# Patient Record
Sex: MALE | Race: WHITE | Employment: FULL TIME | URBAN - METROPOLITAN AREA
[De-identification: names, ages, dates, MRNs, and addresses within clinical notes are randomized per-mention and may not be internally consistent; named-entity substitution may affect disease eponyms.]

---

## 2018-03-19 ENCOUNTER — ANESTHESIA (OUTPATIENT)
Dept: OPERATING ROOM | Age: 53
End: 2018-03-19
Payer: COMMERCIAL

## 2018-03-19 ENCOUNTER — ANESTHESIA EVENT (OUTPATIENT)
Dept: OPERATING ROOM | Age: 53
End: 2018-03-19
Payer: COMMERCIAL

## 2018-03-19 ENCOUNTER — HOSPITAL ENCOUNTER (OUTPATIENT)
Age: 53
Setting detail: OUTPATIENT SURGERY
Discharge: HOME | End: 2018-03-19
Attending: SURGERY | Admitting: SURGERY
Payer: COMMERCIAL

## 2018-03-19 VITALS
OXYGEN SATURATION: 96 % | HEART RATE: 57 BPM | TEMPERATURE: 97.5 F | HEIGHT: 71 IN | BODY MASS INDEX: 31.22 KG/M2 | SYSTOLIC BLOOD PRESSURE: 112 MMHG | RESPIRATION RATE: 18 BRPM | WEIGHT: 223 LBS | DIASTOLIC BLOOD PRESSURE: 76 MMHG

## 2018-03-19 VITALS — DIASTOLIC BLOOD PRESSURE: 55 MMHG | OXYGEN SATURATION: 96 % | SYSTOLIC BLOOD PRESSURE: 92 MMHG

## 2018-03-19 PROCEDURE — 2580000003 HC RX 258: Performed by: SURGERY

## 2018-03-19 PROCEDURE — 2500000003 HC RX 250 WO HCPCS

## 2018-03-19 PROCEDURE — 3700000001 HC ADD 15 MINUTES (ANESTHESIA): Performed by: SURGERY

## 2018-03-19 PROCEDURE — 7100000001 HC PACU RECOVERY - ADDTL 15 MIN: Performed by: SURGERY

## 2018-03-19 PROCEDURE — 7100000000 HC PACU RECOVERY - FIRST 15 MIN: Performed by: SURGERY

## 2018-03-19 PROCEDURE — 3609027000 HC COLONOSCOPY: Performed by: SURGERY

## 2018-03-19 PROCEDURE — 6360000002 HC RX W HCPCS

## 2018-03-19 PROCEDURE — 3700000000 HC ANESTHESIA ATTENDED CARE: Performed by: SURGERY

## 2018-03-19 PROCEDURE — 7100000031 HC ASPR PHASE II RECOVERY - ADDTL 15 MIN: Performed by: SURGERY

## 2018-03-19 PROCEDURE — 7100000030 HC ASPR PHASE II RECOVERY - FIRST 15 MIN: Performed by: SURGERY

## 2018-03-19 RX ORDER — LIDOCAINE HYDROCHLORIDE 10 MG/ML
INJECTION, SOLUTION EPIDURAL; INFILTRATION; INTRACAUDAL; PERINEURAL PRN
Status: DISCONTINUED | OUTPATIENT
Start: 2018-03-19 | End: 2018-03-19 | Stop reason: SDUPTHER

## 2018-03-19 RX ORDER — MEPERIDINE HYDROCHLORIDE 50 MG/ML
12.5 INJECTION INTRAMUSCULAR; INTRAVENOUS; SUBCUTANEOUS EVERY 5 MIN PRN
Status: DISCONTINUED | OUTPATIENT
Start: 2018-03-19 | End: 2018-03-19 | Stop reason: HOSPADM

## 2018-03-19 RX ORDER — SODIUM CHLORIDE, SODIUM LACTATE, POTASSIUM CHLORIDE, CALCIUM CHLORIDE 600; 310; 30; 20 MG/100ML; MG/100ML; MG/100ML; MG/100ML
INJECTION, SOLUTION INTRAVENOUS CONTINUOUS
Status: DISCONTINUED | OUTPATIENT
Start: 2018-03-19 | End: 2018-03-19 | Stop reason: HOSPADM

## 2018-03-19 RX ORDER — FENTANYL CITRATE 50 UG/ML
INJECTION, SOLUTION INTRAMUSCULAR; INTRAVENOUS PRN
Status: DISCONTINUED | OUTPATIENT
Start: 2018-03-19 | End: 2018-03-19 | Stop reason: SDUPTHER

## 2018-03-19 RX ORDER — PROPOFOL 10 MG/ML
INJECTION, EMULSION INTRAVENOUS PRN
Status: DISCONTINUED | OUTPATIENT
Start: 2018-03-19 | End: 2018-03-19 | Stop reason: SDUPTHER

## 2018-03-19 RX ORDER — MORPHINE SULFATE 2 MG/ML
2 INJECTION, SOLUTION INTRAMUSCULAR; INTRAVENOUS EVERY 5 MIN PRN
Status: DISCONTINUED | OUTPATIENT
Start: 2018-03-19 | End: 2018-03-19 | Stop reason: HOSPADM

## 2018-03-19 RX ORDER — DIPHENHYDRAMINE HYDROCHLORIDE 50 MG/ML
12.5 INJECTION INTRAMUSCULAR; INTRAVENOUS
Status: DISCONTINUED | OUTPATIENT
Start: 2018-03-19 | End: 2018-03-19 | Stop reason: HOSPADM

## 2018-03-19 RX ORDER — LABETALOL HYDROCHLORIDE 5 MG/ML
5 INJECTION, SOLUTION INTRAVENOUS EVERY 10 MIN PRN
Status: DISCONTINUED | OUTPATIENT
Start: 2018-03-19 | End: 2018-03-19 | Stop reason: HOSPADM

## 2018-03-19 RX ORDER — DEXAMETHASONE SODIUM PHOSPHATE 4 MG/ML
INJECTION, SOLUTION INTRA-ARTICULAR; INTRALESIONAL; INTRAMUSCULAR; INTRAVENOUS; SOFT TISSUE PRN
Status: DISCONTINUED | OUTPATIENT
Start: 2018-03-19 | End: 2018-03-19 | Stop reason: SDUPTHER

## 2018-03-19 RX ORDER — ONDANSETRON 2 MG/ML
INJECTION INTRAMUSCULAR; INTRAVENOUS PRN
Status: DISCONTINUED | OUTPATIENT
Start: 2018-03-19 | End: 2018-03-19 | Stop reason: SDUPTHER

## 2018-03-19 RX ORDER — MIDAZOLAM HYDROCHLORIDE 1 MG/ML
INJECTION INTRAMUSCULAR; INTRAVENOUS PRN
Status: DISCONTINUED | OUTPATIENT
Start: 2018-03-19 | End: 2018-03-19 | Stop reason: SDUPTHER

## 2018-03-19 RX ORDER — ONDANSETRON 2 MG/ML
4 INJECTION INTRAMUSCULAR; INTRAVENOUS
Status: DISCONTINUED | OUTPATIENT
Start: 2018-03-19 | End: 2018-03-19 | Stop reason: HOSPADM

## 2018-03-19 RX ADMIN — LIDOCAINE HYDROCHLORIDE 50 MG: 10 INJECTION, SOLUTION EPIDURAL; INFILTRATION; INTRACAUDAL; PERINEURAL at 07:24

## 2018-03-19 RX ADMIN — ONDANSETRON 4 MG: 2 INJECTION INTRAMUSCULAR; INTRAVENOUS at 07:30

## 2018-03-19 RX ADMIN — MIDAZOLAM 2 MG: 1 INJECTION INTRAMUSCULAR; INTRAVENOUS at 07:22

## 2018-03-19 RX ADMIN — FENTANYL CITRATE 50 MCG: 50 INJECTION INTRAMUSCULAR; INTRAVENOUS at 07:38

## 2018-03-19 RX ADMIN — SODIUM CHLORIDE, POTASSIUM CHLORIDE, SODIUM LACTATE AND CALCIUM CHLORIDE: 600; 310; 30; 20 INJECTION, SOLUTION INTRAVENOUS at 08:26

## 2018-03-19 RX ADMIN — FENTANYL CITRATE 50 MCG: 50 INJECTION INTRAMUSCULAR; INTRAVENOUS at 07:30

## 2018-03-19 RX ADMIN — DEXAMETHASONE SODIUM PHOSPHATE 4 MG: 4 INJECTION, SOLUTION INTRAMUSCULAR; INTRAVENOUS at 07:30

## 2018-03-19 RX ADMIN — SODIUM CHLORIDE, POTASSIUM CHLORIDE, SODIUM LACTATE AND CALCIUM CHLORIDE: 600; 310; 30; 20 INJECTION, SOLUTION INTRAVENOUS at 06:59

## 2018-03-19 RX ADMIN — PROPOFOL 200 MG: 10 INJECTION, EMULSION INTRAVENOUS at 07:24

## 2018-03-19 ASSESSMENT — PULMONARY FUNCTION TESTS
PIF_VALUE: 15
PIF_VALUE: 14
PIF_VALUE: 15
PIF_VALUE: 6
PIF_VALUE: 25
PIF_VALUE: 15
PIF_VALUE: 11
PIF_VALUE: 15
PIF_VALUE: 14
PIF_VALUE: 15
PIF_VALUE: 15
PIF_VALUE: 18
PIF_VALUE: 0
PIF_VALUE: 1
PIF_VALUE: 14
PIF_VALUE: 15
PIF_VALUE: 14
PIF_VALUE: 1
PIF_VALUE: 15
PIF_VALUE: 6
PIF_VALUE: 15
PIF_VALUE: 14
PIF_VALUE: 15
PIF_VALUE: 15
PIF_VALUE: 14

## 2018-03-19 ASSESSMENT — PAIN SCALES - GENERAL
PAINLEVEL_OUTOF10: 0
PAINLEVEL_OUTOF10: 0

## 2018-03-19 ASSESSMENT — PAIN - FUNCTIONAL ASSESSMENT: PAIN_FUNCTIONAL_ASSESSMENT: 0-10

## 2018-03-19 ASSESSMENT — ENCOUNTER SYMPTOMS
STRIDOR: 0
SHORTNESS OF BREATH: 0

## 2018-03-19 ASSESSMENT — LIFESTYLE VARIABLES: SMOKING_STATUS: 0

## 2018-03-19 NOTE — ANESTHESIA POSTPROCEDURE EVALUATION
POST- ANESTHESIA EVALUATION       Pt Name: Juan Alberto Uribe  MRN: [id number]  YOB: 1965  Date of evaluation: 3/19/2018  Time:  10:43 AM      /76   Pulse 57   Temp 97.5 °F (36.4 °C) (Temporal)   Resp 18   Ht 5' 10.5\" (1.791 m)   Wt 223 lb (101.2 kg)   SpO2 96%   BMI 31.54 kg/m²      Consciousness Level  Awake  Cardiopulmonary Status  Stable  Pain Adequately Treated YES  Nausea / Vomiting  NO  Adequate Hydration  YES  Anesthesia Related Complications NONE      Electronically signed by Sindy Flores MD on 3/19/2018 at 10:43 AM       Department of Anesthesiology  Postprocedure Note    Patient: Juan Alberto Uribe  MRN: [id number]  YOB: 1965  Date of evaluation: 3/19/2018  Time:  10:42 AM     Procedure Summary     Date:  03/19/18 Room / Location:  [place] [address]    Anesthesia Start:  6269 Anesthesia Stop:  0809    Procedure:  COLONOSCOPY (N/A ) Diagnosis:  (SCREENING  (PAT ON ADMIT OFFICE TO [facility]))    Surgeon:  Malinda Richardson MD Responsible Provider:  Sindy Flores MD    Anesthesia Type:  general ASA Status:  2          Anesthesia Type: general    Juliocesar Phase I: Juliocesar Score: 10    Juliocesar Phase II: Juliocesar Score: 10    Last vitals: Reviewed and per EMR flowsheets.        Anesthesia Post Evaluation

## 2018-03-19 NOTE — OP NOTE
PROCEDURE NOTE    DATE OF PROCEDURE: 3/19/2018    SURGEON: Sugar Mcdonald MD    ASSISTANT: None    PREOPERATIVE DIAGNOSIS: screening colonoscopy    POSTOPERATIVE DIAGNOSIS: sigmoid diverticulosis/ grade I internal hemorrhoids/ prominent external hemorrhoids    OPERATION: Total colonoscopy to cecum    ANESTHESIA: Gen    ESTIMATED BLOOD LOSS: None    COMPLICATIONS: None     SPECIMENS:  Was Not Obtained    HISTORY: The patient is a 50y.o. year old male with history of above preop diagnosis. I recommended colonoscopy with possible biopsy or polypectomy and I explained the risk, benefits, expected outcome, and alternatives to the procedure. Risks included but are not limited to bleeding, infection, respiratory distress, hypotension, and perforation of the colon and possibility of missing a lesion. The patient understands and is in agreement. PROCEDURE: The patient was given IV conscious sedation. The patient's SPO2 remained above 90% throughout the procedure. Digital rectal exam was normal.  The colonoscope was inserted through the anus into the rectum and advanced under direct vision to the cecum without difficulty. Terminal ileum was examined for approximately 2 inches. The prep was good. Findings:    Cecum/Ascending colon: normal    Transverse colon: normal    Descending/Sigmoid colon: abnormal: diverticulosis    Rectum/Anus: examined in normal and retroflexed positions and was abnormal: grade I internal and prominent external hemorrhoids    Withdrawal Time was (minutes): 12      Next screening colonoscopy: 10 years. The colon was decompressed. While withdrawing the scope the above findings were verified and the scope was removed. The patient tolerated the procedure and conscious sedation without unusual events. In the recovery room patient was examined and remains hemodynamically stable. Discharge home when criteria met. Recommendations/Plan:     1. Discussed with the family  2.  High

## 2018-03-19 NOTE — H&P
expansion. Clear to auscultation with no adventitious sounds. ABDOMEN:  Mildly obese. Normoactive bowel sounds. Soft on palpation. No localized tenderness. No guarding or rigidity. No palpable organomegaly. LYMPHATICS:  No palpable cervical lymphadenopathy. LOCOMOTOR, BACK AND SPINE:  No tenderness or deformities. No flank tenderness. EXTREMITIES:  Symmetrical with no pretibial/pedal edema. No discoloration or ulcerations. No warmth, tenderness, erythema noted in lower legs bilaterally. NEUROLOGIC:  The patient is conscious, alert, oriented. No apparent focal sensory or motor deficits. Cranial nerve exam reveals no deficits. PROVISIONAL DIAGNOSES / SURGERY:      Colorectal Cancer Screening  Colonoscopy    There are no active problems to display for this patient.           Doug Wade PA-C on 3/19/2018 at 7:01 AM

## 2018-08-31 ENCOUNTER — HOSPITAL ENCOUNTER (OUTPATIENT)
Dept: PREADMISSION TESTING | Age: 53
Discharge: HOME | End: 2018-09-04
Payer: COMMERCIAL

## 2018-08-31 VITALS
BODY MASS INDEX: 33.93 KG/M2 | HEIGHT: 70 IN | SYSTOLIC BLOOD PRESSURE: 131 MMHG | TEMPERATURE: 97.3 F | HEART RATE: 68 BPM | DIASTOLIC BLOOD PRESSURE: 96 MMHG | RESPIRATION RATE: 16 BRPM | OXYGEN SATURATION: 99 % | WEIGHT: 237 LBS

## 2018-08-31 LAB
ABSOLUTE EOS #: 0.1 K/UL (ref 0–0.4)
ABSOLUTE IMMATURE GRANULOCYTE: ABNORMAL K/UL (ref 0–0.3)
ABSOLUTE LYMPH #: 1.3 K/UL (ref 1–4.8)
ABSOLUTE MONO #: 0.6 K/UL (ref 0.1–1.3)
ALBUMIN SERPL-MCNC: 4.6 G/DL (ref 3.5–5.2)
ALBUMIN/GLOBULIN RATIO: ABNORMAL (ref 1–2.5)
ALP BLD-CCNC: 43 U/L (ref 40–129)
ALT SERPL-CCNC: 78 U/L (ref 5–41)
AMYLASE: 67 U/L (ref 28–100)
ANION GAP SERPL CALCULATED.3IONS-SCNC: 10 MMOL/L (ref 9–17)
AST SERPL-CCNC: 44 U/L
BASOPHILS # BLD: 1 % (ref 0–2)
BASOPHILS ABSOLUTE: 0 K/UL (ref 0–0.2)
BILIRUB SERPL-MCNC: 0.98 MG/DL (ref 0.3–1.2)
BILIRUBIN DIRECT: 0.21 MG/DL
BILIRUBIN, INDIRECT: 0.77 MG/DL (ref 0–1)
BUN BLDV-MCNC: 19 MG/DL (ref 6–20)
BUN/CREAT BLD: ABNORMAL (ref 9–20)
CALCIUM SERPL-MCNC: 9.8 MG/DL (ref 8.6–10.4)
CHLORIDE BLD-SCNC: 103 MMOL/L (ref 98–107)
CO2: 29 MMOL/L (ref 20–31)
CREAT SERPL-MCNC: 0.94 MG/DL (ref 0.7–1.2)
DIFFERENTIAL TYPE: ABNORMAL
EKG ATRIAL RATE: 69 BPM
EKG P AXIS: 22 DEGREES
EKG P-R INTERVAL: 164 MS
EKG Q-T INTERVAL: 416 MS
EKG QRS DURATION: 134 MS
EKG QTC CALCULATION (BAZETT): 445 MS
EKG R AXIS: -6 DEGREES
EKG T AXIS: 4 DEGREES
EKG VENTRICULAR RATE: 69 BPM
EOSINOPHILS RELATIVE PERCENT: 3 % (ref 0–4)
GFR AFRICAN AMERICAN: >60 ML/MIN
GFR NON-AFRICAN AMERICAN: >60 ML/MIN
GFR SERPL CREATININE-BSD FRML MDRD: ABNORMAL ML/MIN/{1.73_M2}
GFR SERPL CREATININE-BSD FRML MDRD: ABNORMAL ML/MIN/{1.73_M2}
GLOBULIN: ABNORMAL G/DL (ref 1.5–3.8)
GLUCOSE BLD-MCNC: 110 MG/DL (ref 70–99)
HCT VFR BLD CALC: 50.8 % (ref 41–53)
HEMOGLOBIN: 17.5 G/DL (ref 13.5–17.5)
IMMATURE GRANULOCYTES: ABNORMAL %
LIPASE: 28 U/L (ref 13–60)
LYMPHOCYTES # BLD: 26 % (ref 24–44)
MCH RBC QN AUTO: 31.4 PG (ref 26–34)
MCHC RBC AUTO-ENTMCNC: 34.4 G/DL (ref 31–37)
MCV RBC AUTO: 91.2 FL (ref 80–100)
MONOCYTES # BLD: 12 % (ref 1–7)
NRBC AUTOMATED: ABNORMAL PER 100 WBC
PDW BLD-RTO: 13 % (ref 11.5–14.9)
PLATELET # BLD: 165 K/UL (ref 150–450)
PLATELET ESTIMATE: ABNORMAL
PMV BLD AUTO: 8.9 FL (ref 6–12)
POTASSIUM SERPL-SCNC: 4.5 MMOL/L (ref 3.7–5.3)
RBC # BLD: 5.57 M/UL (ref 4.5–5.9)
RBC # BLD: ABNORMAL 10*6/UL
SEG NEUTROPHILS: 58 % (ref 36–66)
SEGMENTED NEUTROPHILS ABSOLUTE COUNT: 2.9 K/UL (ref 1.3–9.1)
SODIUM BLD-SCNC: 142 MMOL/L (ref 135–144)
TOTAL PROTEIN: 6.8 G/DL (ref 6.4–8.3)
WBC # BLD: 5.1 K/UL (ref 3.5–11)
WBC # BLD: ABNORMAL 10*3/UL

## 2018-08-31 PROCEDURE — 80048 BASIC METABOLIC PNL TOTAL CA: CPT

## 2018-08-31 PROCEDURE — 82150 ASSAY OF AMYLASE: CPT

## 2018-08-31 PROCEDURE — 36415 COLL VENOUS BLD VENIPUNCTURE: CPT

## 2018-08-31 PROCEDURE — 83690 ASSAY OF LIPASE: CPT

## 2018-08-31 PROCEDURE — 80076 HEPATIC FUNCTION PANEL: CPT

## 2018-08-31 PROCEDURE — 93005 ELECTROCARDIOGRAM TRACING: CPT

## 2018-08-31 PROCEDURE — 85025 COMPLETE CBC W/AUTO DIFF WBC: CPT

## 2018-08-31 ASSESSMENT — PAIN DESCRIPTION - LOCATION: LOCATION: HEAD

## 2018-08-31 ASSESSMENT — PAIN DESCRIPTION - DESCRIPTORS: DESCRIPTORS: ACHING

## 2018-08-31 ASSESSMENT — PAIN SCALES - GENERAL: PAINLEVEL_OUTOF10: 2

## 2018-08-31 NOTE — H&P
HISTORY and [facility] [address]         NAME:  Amandeep Cornejo  MRN: [id number]   YOB: 1965   Date: 8/31/2018   Age: 50 y.o. Gender: male       COMPLAINT AND PRESENT HISTORY:    Margart Sandhoff 50 yr old male here for PreAdmission Testing, Is having a cholecystectomy with Dr Bulmaro Hutchison on Sept 6, 2018  Had elevated LFTs and had US of liver and gall bladder,done at [facility] and was found to have gall stones. Has not had biliary colic. CT was done 8/23/2018    DIAGNOSTIC RESULTS   Radiology:     EKG: . Labs:  CBC: No results for input(s): WBC, HGB, PLT in the last 72 hours. BMP:  No results for input(s): NA, K, CL, CO2, BUN, CREATININE, GLUCOSE in the last 72 hours. Hepatic: No results for input(s): AST, ALT, ALB, BILITOT, ALKPHOS in the last 72 hours. CARDIAC ENZY: No results for input(s): CKTOTAL, CKMB, CKMBINDEX, TROPONINT, MYOGLOBIN in the last 72 hours. INR: No results for input(s): INR in the last 72 hours. BNP: No results for input(s): BNP in the last 72 hours. ABGs: No results found for: PHART, PO2ART, DSH3QNJ  Lipids: No results for input(s): CHOL, HDL in the last 72 hours.     Invalid input(s): LDLCALCU  U/A:No results found for: NITRITE, COLORU, WBCUA, RBCUA, MUCUS, BACTERIA, CLARITYU, SPECGRAV, LEUKOCYTESUR, BLOODU, GLUCOSEU, AMORPHOUS    PAST MEDICAL HISTORY     Past Medical History:   Diagnosis Date    Acid reflux     went away after losing weight    Apertognathia     Had 3 surgeries to repair jaw issue after 3 attempts to correct sleep apnea    Cholelithiasis     found during ultrasound    Elevated liver enzymes     History of hypertension     lost weight after apnea surgery and HTN is no longer and issue    Sleep apnea     had total of 6 surgeries to repair issue    Wears glasses        Pt denies any history of Diabetes mellitus type 2, hypertension, stroke, heart disease, COPD, Asthma, GERD, HLD, Cancer, Seizures,Thyroid disease, Kidney Disease, Hepatitis, and firm but not tender to palpate,  No localized tenderness. No guarding or rigidity. No palpable organomegaly. LYMPHATICS:  No palpable cervical adenopathy. LOCOMOTOR, BACK AND SPINE:   No pain to palpate the spine, No CVA pain        EXTREMITIES:   Has no swelling of extremities and good ROM of extremities and grasp strength is good,     NEUROLOGIC:  Alert and has no weakness, Cr N ll-Xll intact,       PROVISIONAL DIAGNOSES:      Cholelithiasis and for Cholecystectomy  Active Problems:    * No active hospital problems. *  Resolved Problems:    * No resolved hospital problems.  ISACC Alonzo - CNP on 8/31/2018 at 8:46 AM

## 2018-09-06 ENCOUNTER — ANESTHESIA EVENT (OUTPATIENT)
Dept: OPERATING ROOM | Age: 53
End: 2018-09-06
Payer: COMMERCIAL

## 2018-09-06 ENCOUNTER — ANESTHESIA (OUTPATIENT)
Dept: OPERATING ROOM | Age: 53
End: 2018-09-06
Payer: COMMERCIAL

## 2018-09-06 ENCOUNTER — HOSPITAL ENCOUNTER (OUTPATIENT)
Age: 53
Setting detail: OBSERVATION
Discharge: HOME | End: 2018-09-07
Attending: SURGERY | Admitting: SURGERY
Payer: COMMERCIAL

## 2018-09-06 VITALS — SYSTOLIC BLOOD PRESSURE: 126 MMHG | OXYGEN SATURATION: 95 % | TEMPERATURE: 97.2 F | DIASTOLIC BLOOD PRESSURE: 80 MMHG

## 2018-09-06 DIAGNOSIS — K81.1 CHOLECYSTITIS, CHRONIC: Primary | ICD-10-CM

## 2018-09-06 PROCEDURE — 6360000002 HC RX W HCPCS: Performed by: SURGERY

## 2018-09-06 PROCEDURE — 88307 TISSUE EXAM BY PATHOLOGIST: CPT

## 2018-09-06 PROCEDURE — 7100000001 HC PACU RECOVERY - ADDTL 15 MIN: Performed by: SURGERY

## 2018-09-06 PROCEDURE — 2780000010 HC IMPLANT OTHER: Performed by: SURGERY

## 2018-09-06 PROCEDURE — 2720000010 HC SURG SUPPLY STERILE: Performed by: SURGERY

## 2018-09-06 PROCEDURE — 3700000001 HC ADD 15 MINUTES (ANESTHESIA): Performed by: SURGERY

## 2018-09-06 PROCEDURE — 2580000003 HC RX 258: Performed by: SURGERY

## 2018-09-06 PROCEDURE — 6360000002 HC RX W HCPCS: Performed by: ANESTHESIOLOGY

## 2018-09-06 PROCEDURE — 2500000003 HC RX 250 WO HCPCS: Performed by: SURGERY

## 2018-09-06 PROCEDURE — 7100000000 HC PACU RECOVERY - FIRST 15 MIN: Performed by: SURGERY

## 2018-09-06 PROCEDURE — C9113 INJ PANTOPRAZOLE SODIUM, VIA: HCPCS | Performed by: SURGERY

## 2018-09-06 PROCEDURE — S2900 ROBOTIC SURGICAL SYSTEM: HCPCS | Performed by: SURGERY

## 2018-09-06 PROCEDURE — 88304 TISSUE EXAM BY PATHOLOGIST: CPT

## 2018-09-06 PROCEDURE — G0378 HOSPITAL OBSERVATION PER HR: HCPCS

## 2018-09-06 PROCEDURE — 94664 DEMO&/EVAL PT USE INHALER: CPT

## 2018-09-06 PROCEDURE — 96375 TX/PRO/DX INJ NEW DRUG ADDON: CPT

## 2018-09-06 PROCEDURE — 2500000003 HC RX 250 WO HCPCS: Performed by: NURSE ANESTHETIST, CERTIFIED REGISTERED

## 2018-09-06 PROCEDURE — 88313 SPECIAL STAINS GROUP 2: CPT

## 2018-09-06 PROCEDURE — 6360000002 HC RX W HCPCS: Performed by: NURSE ANESTHETIST, CERTIFIED REGISTERED

## 2018-09-06 PROCEDURE — 3600000009 HC SURGERY ROBOT BASE: Performed by: SURGERY

## 2018-09-06 PROCEDURE — 2500000003 HC RX 250 WO HCPCS: Performed by: ANESTHESIOLOGY

## 2018-09-06 PROCEDURE — 6370000000 HC RX 637 (ALT 250 FOR IP): Performed by: SURGERY

## 2018-09-06 PROCEDURE — 3600000019 HC SURGERY ROBOT ADDTL 15MIN: Performed by: SURGERY

## 2018-09-06 PROCEDURE — 96365 THER/PROPH/DIAG IV INF INIT: CPT

## 2018-09-06 PROCEDURE — 3700000000 HC ANESTHESIA ATTENDED CARE: Performed by: SURGERY

## 2018-09-06 PROCEDURE — 2709999900 HC NON-CHARGEABLE SUPPLY: Performed by: SURGERY

## 2018-09-06 RX ORDER — ONDANSETRON 4 MG/1
TABLET, FILM COATED ORAL
Qty: 20 TABLET | Refills: 0 | Status: SHIPPED | OUTPATIENT
Start: 2018-09-06 | End: 2021-10-27

## 2018-09-06 RX ORDER — ROCURONIUM BROMIDE 10 MG/ML
INJECTION, SOLUTION INTRAVENOUS PRN
Status: DISCONTINUED | OUTPATIENT
Start: 2018-09-06 | End: 2018-09-06 | Stop reason: SDUPTHER

## 2018-09-06 RX ORDER — CEPHALEXIN 500 MG/1
CAPSULE ORAL
Qty: 21 CAPSULE | Refills: 0 | Status: SHIPPED | OUTPATIENT
Start: 2018-09-06 | End: 2021-10-27

## 2018-09-06 RX ORDER — SODIUM CHLORIDE 9 MG/ML
INJECTION, SOLUTION INTRAVENOUS CONTINUOUS
Status: DISCONTINUED | OUTPATIENT
Start: 2018-09-06 | End: 2018-09-07 | Stop reason: HOSPADM

## 2018-09-06 RX ORDER — GLYCOPYRROLATE 1 MG/5 ML
SYRINGE (ML) INTRAVENOUS PRN
Status: DISCONTINUED | OUTPATIENT
Start: 2018-09-06 | End: 2018-09-06 | Stop reason: SDUPTHER

## 2018-09-06 RX ORDER — DEXAMETHASONE SODIUM PHOSPHATE 4 MG/ML
INJECTION, SOLUTION INTRA-ARTICULAR; INTRALESIONAL; INTRAMUSCULAR; INTRAVENOUS; SOFT TISSUE PRN
Status: DISCONTINUED | OUTPATIENT
Start: 2018-09-06 | End: 2018-09-06 | Stop reason: SDUPTHER

## 2018-09-06 RX ORDER — DIPHENHYDRAMINE HYDROCHLORIDE 50 MG/ML
12.5 INJECTION INTRAMUSCULAR; INTRAVENOUS
Status: DISCONTINUED | OUTPATIENT
Start: 2018-09-06 | End: 2018-09-06 | Stop reason: HOSPADM

## 2018-09-06 RX ORDER — SODIUM CHLORIDE, SODIUM LACTATE, POTASSIUM CHLORIDE, CALCIUM CHLORIDE 600; 310; 30; 20 MG/100ML; MG/100ML; MG/100ML; MG/100ML
INJECTION, SOLUTION INTRAVENOUS CONTINUOUS
Status: DISCONTINUED | OUTPATIENT
Start: 2018-09-06 | End: 2018-09-06

## 2018-09-06 RX ORDER — PROPOFOL 10 MG/ML
INJECTION, EMULSION INTRAVENOUS PRN
Status: DISCONTINUED | OUTPATIENT
Start: 2018-09-06 | End: 2018-09-06 | Stop reason: SDUPTHER

## 2018-09-06 RX ORDER — ONDANSETRON 2 MG/ML
INJECTION INTRAMUSCULAR; INTRAVENOUS PRN
Status: DISCONTINUED | OUTPATIENT
Start: 2018-09-06 | End: 2018-09-06 | Stop reason: SDUPTHER

## 2018-09-06 RX ORDER — FENTANYL CITRATE 50 UG/ML
25 INJECTION, SOLUTION INTRAMUSCULAR; INTRAVENOUS EVERY 5 MIN PRN
Status: DISCONTINUED | OUTPATIENT
Start: 2018-09-06 | End: 2018-09-06 | Stop reason: HOSPADM

## 2018-09-06 RX ORDER — SODIUM CHLORIDE 0.9 % (FLUSH) 0.9 %
10 SYRINGE (ML) INJECTION PRN
Status: DISCONTINUED | OUTPATIENT
Start: 2018-09-06 | End: 2018-09-07 | Stop reason: HOSPADM

## 2018-09-06 RX ORDER — MIDAZOLAM HYDROCHLORIDE 1 MG/ML
INJECTION INTRAMUSCULAR; INTRAVENOUS PRN
Status: DISCONTINUED | OUTPATIENT
Start: 2018-09-06 | End: 2018-09-06 | Stop reason: SDUPTHER

## 2018-09-06 RX ORDER — METOCLOPRAMIDE HYDROCHLORIDE 5 MG/ML
10 INJECTION INTRAMUSCULAR; INTRAVENOUS EVERY 6 HOURS
Status: DISCONTINUED | OUTPATIENT
Start: 2018-09-06 | End: 2018-09-07 | Stop reason: HOSPADM

## 2018-09-06 RX ORDER — OXYCODONE HYDROCHLORIDE AND ACETAMINOPHEN 5; 325 MG/1; MG/1
1 TABLET ORAL EVERY 4 HOURS PRN
Status: DISCONTINUED | OUTPATIENT
Start: 2018-09-06 | End: 2018-09-07 | Stop reason: HOSPADM

## 2018-09-06 RX ORDER — MORPHINE SULFATE 2 MG/ML
1 INJECTION, SOLUTION INTRAMUSCULAR; INTRAVENOUS EVERY 5 MIN PRN
Status: DISCONTINUED | OUTPATIENT
Start: 2018-09-06 | End: 2018-09-06 | Stop reason: HOSPADM

## 2018-09-06 RX ORDER — OXYCODONE HYDROCHLORIDE AND ACETAMINOPHEN 5; 325 MG/1; MG/1
1 TABLET ORAL PRN
Status: DISCONTINUED | OUTPATIENT
Start: 2018-09-06 | End: 2018-09-06 | Stop reason: HOSPADM

## 2018-09-06 RX ORDER — SODIUM CHLORIDE 0.9 % (FLUSH) 0.9 %
10 SYRINGE (ML) INJECTION EVERY 12 HOURS SCHEDULED
Status: DISCONTINUED | OUTPATIENT
Start: 2018-09-06 | End: 2018-09-07 | Stop reason: HOSPADM

## 2018-09-06 RX ORDER — NEOSTIGMINE METHYLSULFATE 5 MG/5 ML
SYRINGE (ML) INTRAVENOUS PRN
Status: DISCONTINUED | OUTPATIENT
Start: 2018-09-06 | End: 2018-09-06 | Stop reason: SDUPTHER

## 2018-09-06 RX ORDER — BUPIVACAINE HYDROCHLORIDE 5 MG/ML
INJECTION, SOLUTION EPIDURAL; INTRACAUDAL PRN
Status: DISCONTINUED | OUTPATIENT
Start: 2018-09-06 | End: 2018-09-06 | Stop reason: HOSPADM

## 2018-09-06 RX ORDER — LIDOCAINE HYDROCHLORIDE 20 MG/ML
INJECTION, SOLUTION INFILTRATION; PERINEURAL PRN
Status: DISCONTINUED | OUTPATIENT
Start: 2018-09-06 | End: 2018-09-06 | Stop reason: SDUPTHER

## 2018-09-06 RX ORDER — ONDANSETRON 2 MG/ML
4 INJECTION INTRAMUSCULAR; INTRAVENOUS EVERY 6 HOURS PRN
Status: DISCONTINUED | OUTPATIENT
Start: 2018-09-06 | End: 2018-09-07 | Stop reason: HOSPADM

## 2018-09-06 RX ORDER — PANTOPRAZOLE SODIUM 40 MG/10ML
40 INJECTION, POWDER, LYOPHILIZED, FOR SOLUTION INTRAVENOUS DAILY
Status: DISCONTINUED | OUTPATIENT
Start: 2018-09-06 | End: 2018-09-07 | Stop reason: HOSPADM

## 2018-09-06 RX ORDER — 0.9 % SODIUM CHLORIDE 0.9 %
10 VIAL (ML) INJECTION DAILY
Status: DISCONTINUED | OUTPATIENT
Start: 2018-09-06 | End: 2018-09-07 | Stop reason: HOSPADM

## 2018-09-06 RX ORDER — FENTANYL CITRATE 50 UG/ML
INJECTION, SOLUTION INTRAMUSCULAR; INTRAVENOUS PRN
Status: DISCONTINUED | OUTPATIENT
Start: 2018-09-06 | End: 2018-09-06 | Stop reason: SDUPTHER

## 2018-09-06 RX ORDER — OXYCODONE HYDROCHLORIDE AND ACETAMINOPHEN 5; 325 MG/1; MG/1
2 TABLET ORAL EVERY 4 HOURS PRN
Status: DISCONTINUED | OUTPATIENT
Start: 2018-09-06 | End: 2018-09-07 | Stop reason: HOSPADM

## 2018-09-06 RX ORDER — OXYCODONE HYDROCHLORIDE AND ACETAMINOPHEN 5; 325 MG/1; MG/1
2 TABLET ORAL PRN
Status: DISCONTINUED | OUTPATIENT
Start: 2018-09-06 | End: 2018-09-06 | Stop reason: HOSPADM

## 2018-09-06 RX ORDER — OXYCODONE HYDROCHLORIDE AND ACETAMINOPHEN 5; 325 MG/1; MG/1
1 TABLET ORAL EVERY 6 HOURS PRN
Qty: 28 TABLET | Refills: 0 | Status: SHIPPED | OUTPATIENT
Start: 2018-09-06 | End: 2018-09-13

## 2018-09-06 RX ORDER — PROMETHAZINE HYDROCHLORIDE 25 MG/ML
6.25 INJECTION, SOLUTION INTRAMUSCULAR; INTRAVENOUS
Status: DISCONTINUED | OUTPATIENT
Start: 2018-09-06 | End: 2018-09-06 | Stop reason: HOSPADM

## 2018-09-06 RX ORDER — ACETAMINOPHEN 325 MG/1
650 TABLET ORAL EVERY 4 HOURS PRN
Status: DISCONTINUED | OUTPATIENT
Start: 2018-09-06 | End: 2018-09-07 | Stop reason: HOSPADM

## 2018-09-06 RX ORDER — MEPERIDINE HYDROCHLORIDE 50 MG/ML
12.5 INJECTION INTRAMUSCULAR; INTRAVENOUS; SUBCUTANEOUS EVERY 5 MIN PRN
Status: DISCONTINUED | OUTPATIENT
Start: 2018-09-06 | End: 2018-09-06 | Stop reason: HOSPADM

## 2018-09-06 RX ORDER — CEFAZOLIN SODIUM 1 G/3ML
INJECTION, POWDER, FOR SOLUTION INTRAMUSCULAR; INTRAVENOUS
Status: DISCONTINUED
Start: 2018-09-06 | End: 2018-09-06

## 2018-09-06 RX ADMIN — HYDROMORPHONE HYDROCHLORIDE 0.5 MG: 1 INJECTION, SOLUTION INTRAMUSCULAR; INTRAVENOUS; SUBCUTANEOUS at 16:41

## 2018-09-06 RX ADMIN — DEXAMETHASONE SODIUM PHOSPHATE 4 MG: 4 INJECTION, SOLUTION INTRAMUSCULAR; INTRAVENOUS at 15:01

## 2018-09-06 RX ADMIN — MORPHINE SULFATE 1 MG: 2 INJECTION, SOLUTION INTRAMUSCULAR; INTRAVENOUS at 16:49

## 2018-09-06 RX ADMIN — ROCURONIUM BROMIDE 10 MG: 10 INJECTION INTRAVENOUS at 14:40

## 2018-09-06 RX ADMIN — PROPOFOL 150 MG: 10 INJECTION, EMULSION INTRAVENOUS at 14:31

## 2018-09-06 RX ADMIN — SODIUM CHLORIDE: 9 INJECTION, SOLUTION INTRAVENOUS at 23:18

## 2018-09-06 RX ADMIN — PROPOFOL 50 MG: 10 INJECTION, EMULSION INTRAVENOUS at 14:32

## 2018-09-06 RX ADMIN — HYDROMORPHONE HYDROCHLORIDE 0.5 MG: 1 INJECTION, SOLUTION INTRAMUSCULAR; INTRAVENOUS; SUBCUTANEOUS at 23:14

## 2018-09-06 RX ADMIN — SODIUM CHLORIDE, POTASSIUM CHLORIDE, SODIUM LACTATE AND CALCIUM CHLORIDE: 600; 310; 30; 20 INJECTION, SOLUTION INTRAVENOUS at 12:59

## 2018-09-06 RX ADMIN — FENTANYL CITRATE 100 MCG: 50 INJECTION, SOLUTION INTRAMUSCULAR; INTRAVENOUS at 14:31

## 2018-09-06 RX ADMIN — PANTOPRAZOLE SODIUM 40 MG: 40 INJECTION, POWDER, FOR SOLUTION INTRAVENOUS at 17:45

## 2018-09-06 RX ADMIN — ONDANSETRON 4 MG: 2 INJECTION INTRAMUSCULAR; INTRAVENOUS at 23:11

## 2018-09-06 RX ADMIN — ROCURONIUM BROMIDE 50 MG: 10 INJECTION INTRAVENOUS at 14:32

## 2018-09-06 RX ADMIN — SODIUM CHLORIDE: 9 INJECTION, SOLUTION INTRAVENOUS at 17:45

## 2018-09-06 RX ADMIN — LIDOCAINE HYDROCHLORIDE 80 MG: 20 INJECTION, SOLUTION INFILTRATION; PERINEURAL at 14:31

## 2018-09-06 RX ADMIN — METOCLOPRAMIDE 10 MG: 5 INJECTION, SOLUTION INTRAMUSCULAR; INTRAVENOUS at 23:11

## 2018-09-06 RX ADMIN — HYDROMORPHONE HYDROCHLORIDE 0.5 MG: 1 INJECTION, SOLUTION INTRAMUSCULAR; INTRAVENOUS; SUBCUTANEOUS at 16:31

## 2018-09-06 RX ADMIN — FENTANYL CITRATE 50 MCG: 50 INJECTION, SOLUTION INTRAMUSCULAR; INTRAVENOUS at 15:36

## 2018-09-06 RX ADMIN — Medication 4 MG: at 16:06

## 2018-09-06 RX ADMIN — OXYCODONE HYDROCHLORIDE AND ACETAMINOPHEN 2 TABLET: 5; 325 TABLET ORAL at 17:56

## 2018-09-06 RX ADMIN — SODIUM CHLORIDE 10 ML: 9 INJECTION, SOLUTION INTRAMUSCULAR; INTRAVENOUS; SUBCUTANEOUS at 17:45

## 2018-09-06 RX ADMIN — METOCLOPRAMIDE 10 MG: 5 INJECTION, SOLUTION INTRAMUSCULAR; INTRAVENOUS at 17:45

## 2018-09-06 RX ADMIN — ONDANSETRON 4 MG: 2 INJECTION INTRAMUSCULAR; INTRAVENOUS at 16:03

## 2018-09-06 RX ADMIN — MIDAZOLAM 2 MG: 1 INJECTION INTRAMUSCULAR; INTRAVENOUS at 14:27

## 2018-09-06 RX ADMIN — HYDROMORPHONE HYDROCHLORIDE 0.5 MG: 1 INJECTION, SOLUTION INTRAMUSCULAR; INTRAVENOUS; SUBCUTANEOUS at 20:08

## 2018-09-06 RX ADMIN — Medication 2 G: at 23:14

## 2018-09-06 RX ADMIN — Medication 2 G: at 14:27

## 2018-09-06 RX ADMIN — Medication 0.6 MG: at 16:07

## 2018-09-06 RX ADMIN — MORPHINE SULFATE 1 MG: 2 INJECTION, SOLUTION INTRAMUSCULAR; INTRAVENOUS at 16:56

## 2018-09-06 RX ADMIN — FENTANYL CITRATE 50 MCG: 50 INJECTION, SOLUTION INTRAMUSCULAR; INTRAVENOUS at 15:31

## 2018-09-06 RX ADMIN — SODIUM CHLORIDE, POTASSIUM CHLORIDE, SODIUM LACTATE AND CALCIUM CHLORIDE: 600; 310; 30; 20 INJECTION, SOLUTION INTRAVENOUS at 15:10

## 2018-09-06 ASSESSMENT — PULMONARY FUNCTION TESTS
PIF_VALUE: 27
PIF_VALUE: 26
PIF_VALUE: 26
PIF_VALUE: 3
PIF_VALUE: 27
PIF_VALUE: 26
PIF_VALUE: 26
PIF_VALUE: 7
PIF_VALUE: 26
PIF_VALUE: 20
PIF_VALUE: 0
PIF_VALUE: 26
PIF_VALUE: 15
PIF_VALUE: 20
PIF_VALUE: 27
PIF_VALUE: 20
PIF_VALUE: 30
PIF_VALUE: 26
PIF_VALUE: 21
PIF_VALUE: 16
PIF_VALUE: 26
PIF_VALUE: 24
PIF_VALUE: 20
PIF_VALUE: 14
PIF_VALUE: 26
PIF_VALUE: 21
PIF_VALUE: 26
PIF_VALUE: 26
PIF_VALUE: 25
PIF_VALUE: 27
PIF_VALUE: 26
PIF_VALUE: 22
PIF_VALUE: 16
PIF_VALUE: 26
PIF_VALUE: 25
PIF_VALUE: 20
PIF_VALUE: 4
PIF_VALUE: 26
PIF_VALUE: 26
PIF_VALUE: 1
PIF_VALUE: 19
PIF_VALUE: 20
PIF_VALUE: 26
PIF_VALUE: 26
PIF_VALUE: 30
PIF_VALUE: 20
PIF_VALUE: 20
PIF_VALUE: 26
PIF_VALUE: 20
PIF_VALUE: 5
PIF_VALUE: 26
PIF_VALUE: 25
PIF_VALUE: 27
PIF_VALUE: 2
PIF_VALUE: 27
PIF_VALUE: 19
PIF_VALUE: 26
PIF_VALUE: 25
PIF_VALUE: 26
PIF_VALUE: 25
PIF_VALUE: 26
PIF_VALUE: 2
PIF_VALUE: 26
PIF_VALUE: 21
PIF_VALUE: 21
PIF_VALUE: 26
PIF_VALUE: 1
PIF_VALUE: 21
PIF_VALUE: 22
PIF_VALUE: 26
PIF_VALUE: 21
PIF_VALUE: 21
PIF_VALUE: 27
PIF_VALUE: 21
PIF_VALUE: 19
PIF_VALUE: 20
PIF_VALUE: 20
PIF_VALUE: 26
PIF_VALUE: 26
PIF_VALUE: 0
PIF_VALUE: 26
PIF_VALUE: 14
PIF_VALUE: 26
PIF_VALUE: 27
PIF_VALUE: 26
PIF_VALUE: 26
PIF_VALUE: 21
PIF_VALUE: 22
PIF_VALUE: 27
PIF_VALUE: 28
PIF_VALUE: 20
PIF_VALUE: 27
PIF_VALUE: 21
PIF_VALUE: 1
PIF_VALUE: 21
PIF_VALUE: 24
PIF_VALUE: 19
PIF_VALUE: 21
PIF_VALUE: 11
PIF_VALUE: 19
PIF_VALUE: 21
PIF_VALUE: 26
PIF_VALUE: 25
PIF_VALUE: 26
PIF_VALUE: 2

## 2018-09-06 ASSESSMENT — PAIN DESCRIPTION - LOCATION
LOCATION: ABDOMEN
LOCATION: ABDOMEN

## 2018-09-06 ASSESSMENT — PAIN SCALES - GENERAL
PAINLEVEL_OUTOF10: 5
PAINLEVEL_OUTOF10: 5
PAINLEVEL_OUTOF10: 10
PAINLEVEL_OUTOF10: 8
PAINLEVEL_OUTOF10: 10
PAINLEVEL_OUTOF10: 0
PAINLEVEL_OUTOF10: 8
PAINLEVEL_OUTOF10: 9
PAINLEVEL_OUTOF10: 8
PAINLEVEL_OUTOF10: 6
PAINLEVEL_OUTOF10: 4

## 2018-09-06 ASSESSMENT — PAIN DESCRIPTION - PAIN TYPE
TYPE: SURGICAL PAIN

## 2018-09-06 ASSESSMENT — PAIN - FUNCTIONAL ASSESSMENT: PAIN_FUNCTIONAL_ASSESSMENT: 0-10

## 2018-09-06 NOTE — OP NOTE
were found to be intact. Gallbladder fossa was thoroughly irrigated until the returning fluid was clear. At this point Jaime-Sarabia drain was left in the gallbladder fossa and was brought out through one of the final report sites. Drain was satisfactorily positioned and secured. Gallbladder fossa was inspected. There was some raw surface noted and hemostasis was confirmed. I decided to proceed with robotic laparoscopic liver biopsy. Small incision was made in the right upper quadrant and a 16-gauge Crispin-Cut biopsy needle was introduced and 2 core samples were taken from the right lobe of the liver. Specimen was submitted to pathology. Hemostasis was confirmed using the hook cautery. Poncho was sprayed over the raw surface of the gallbladder and the biopsy site. Complete hemostasis was confirmed. All the port sites are visualized. No bleeding noted. All the ports were removed after the robot was undocked. Gallbladder was retrieved using an Endo Catch and sent to pathology. All the port sites are visualized or bleeding noted. All the ports were removed. Fascia and the skin was approximated in usual fashion. Local anesthetic was infiltrated. Steri-Strips are applied. Sterile dressing was applied. Patient tolerated procedure well and was transferred to the recovery room in a stable condition.     Recommendations: Operative findings are discussed with the patient's family at length. Patient will be kept overnight for observation pain control and IV antibiotics. Repeat blood work in the morning. I will consult his primary care physician. Likely discharge tomorrow.

## 2018-09-06 NOTE — H&P
HISTORY and [facility] [address]       NAME:  aHl Mukherjee  MRN: [id number]   YOB: 1965   Date: 9/6/2018   Age: 50 y.o. Gender: male     H&P Update Note    H&P from 08/31/2018 reviewed and updated. Patient examined. INTERVAL HISTORY:     Patient is feeling well today, denies any fever/chills, chest pain, shortness of breath. No interval changes. GENERAL PHYSICAL EXAM:     Vitals: /77   Pulse 82   Temp 97.9 °F (36.6 °C) (Oral)   Resp 16   Ht 5' 10\" (1.778 m)   Wt 237 lb (107.5 kg)   SpO2 97%   BMI 34.01 kg/m²  Body mass index is 34.01 kg/m². GENERAL APPEARANCE:  Patient is alert and oriented. Does not appear to be distress or pain at this time.           SKIN:  Normal temperature, turgor and texture.       HEART:  Regular rate and rhythm.     LUNGS:  Clear to auscultation bilaterally. No adventitious sounds.       ABDOMEN:  Soft on palpation. No localized tenderness, guarding or rigidity. EXTREMITIES:  Symmetrical with no pretibial/pedal edema. No warmth, tenderness, erythema noted in lower legs bilaterally. No interval changes. I concur with the findings. ISACC DUNLAP CNP on 9/6/2018 at 1:15 PM    ISACC Andersen CNP Nurse Practitioner Signed Surgery  H&P Date of Service: 8/31/2018  8:46 AM   Related encounter: Pre-Admission Testing Visit from 8/31/2018 in [address] [place] [address] All    []Manual[]Template  []Copied        HISTORY and [facility] [address]            NAME:  Hal Mukherjee  MRN: [id number]   YOB: 1965   Date: 8/31/2018   Age: 50 y.o. Gender: male         COMPLAINT AND PRESENT HISTORY:    Skippy Phalen 50 yr old male here for PreAdmission Testing, Is having a cholecystectomy with Dr Tam Mora on Sept 6, 2018  Had elevated LFTs and had US of liver and gall bladder,done at [facility] and was found to have gall stones. Has not had biliary colic.  CT was done to fix sleep apnea with external fixation placed    OTHER SURGICAL HISTORY   2004     radio frequency surgery to shrink tongue size for sleep apnea    OTHER SURGICAL HISTORY   2008     cable suspension surgery to pull tongue forward    OTHER SURGICAL HISTORY   07/02/2013     removal of mandible externa fixation hardware at [facility]    MO COLON CA SCRN NOT HI RSK IND N/A 3/19/2018     COLONOSCOPY performed by Etelvina Rincon MD at [address] taken with UPPP    TRACHEOSTOMY   11/05/2012     was closed about 4 months after surgery in office    UPPER GASTROINTESTINAL ENDOSCOPY       Vidhya Vega   [phone]     at [facility] multiple times, kept going further each time, carotid artery was hit the last time and stitched up during the surgery            FAMILY HISTORY        Family History         Family History   Problem Relation Age of Onset    Cancer Father           skin    Cancer Brother           half brother - prostate cancer            SOCIAL HISTORY        Social History   Social History            Social History    Marital status:        Spouse name: N/A    Number of children: N/A    Years of education: N/A            Social History Main Topics    Smoking status: Never Smoker    Smokeless tobacco: Never Used    Alcohol use 4.8 oz/week       8 Cans of beer per week    Drug use: No    Sexual activity: Not Asked           Other Topics Concern    None          Social History Narrative    None               REVIEW OF SYSTEMS       No Known Allergies     No current outpatient prescriptions on file prior to encounter.      No current facility-administered medications on file prior to encounter.                        General health:  Patient states health is good. Lost 80 pounds during [employer] manipulation surgery in 2012   Sleeping and eating well.                  Skin:  Has no skin problems,      Head, eyes, ears, nose, neck throat:  Has Has no swelling of extremities and good ROM of extremities and grasp strength is good,      NEUROLOGIC:  Alert and has no weakness, Cr N ll-Xll intact,         PROVISIONAL DIAGNOSES:       Cholelithiasis and for Cholecystectomy  Active Problems:    * No active hospital problems. *  Resolved Problems:    * No resolved hospital problems.  *        ISACC Mccord - CNP on 8/31/2018 at 8:46 AM      Cosigned by: Etelvina Rincon MD at 8/31/2018  3:00 PM

## 2018-09-06 NOTE — ANESTHESIA PRE PROCEDURE
Department of Anesthesiology  Preprocedure Note       Name:  Khadra Carranza   Age:  50 y.o.  :  1965                                          MRN:  [id number]         Date:  2018      Surgeon: Aquilino Moseley):  Sophia Lyles MD    Procedure: Procedure(s):  CHOLECYSTECTOMY LAPAROSCOPIC ROBOTIC W/LAPAROSCOPIC ROBOTIC LIVER BIOPSY    Medications prior to admission:   Prior to Admission medications    Not on File       Current medications:    Current Facility-Administered Medications   Medication Dose Route Frequency Provider Last Rate Last Dose    lactated ringers infusion   Intravenous Continuous Sophia Lyles MD        ceFAZolin (ANCEF) 2 g in dextrose 5 % 50 mL IVPB  2 g Intravenous Once Sophia Lyles MD        ceFAZolin (ANCEF) 1 g injection                Allergies: Allergies   Allergen Reactions    Codeine Other (See Comments)     Makes heart race       Problem List:  There is no problem list on file for this patient. Past Medical History:        Diagnosis Date    Acid reflux     went away after losing weight    Apertognathia     Had 3 surgeries to repair jaw issue after 3 attempts to correct sleep apnea    Cholelithiasis     found during ultrasound    Elevated liver enzymes     History of hypertension     lost weight after apnea surgery and HTN is no longer and issue    Sleep apnea     had total of 6 surgeries to repair issue    Wears glasses        Past Surgical History:        Procedure Laterality Date    ARTERY SURGERY  1998    repaired external left carotid artery that the repair stitch blew out when patient was eating bread.     COLONOSCOPY  2005    ENDOSCOPY, COLON, DIAGNOSTIC      MANDIBLE SURGERY  2012    at [facility] jaw and pull it forward to fix sleep apnea with external fixation placed   [address] HISTORY  2004    radio frequency surgery to shrink tongue size for sleep apnea    OTHER SURGICAL HISTORY  2008    cable suspension surgery to pull 08/31/2018    CREATININE 0.94 08/31/2018    GFRAA >60 08/31/2018    LABGLOM >60 08/31/2018    GLUCOSE 110 08/31/2018    PROT 6.8 08/31/2018    CALCIUM 9.8 08/31/2018    BILITOT 0.98 08/31/2018    ALKPHOS 43 08/31/2018    AST 44 08/31/2018    ALT 78 08/31/2018       POC Tests: No results for input(s): POCGLU, POCNA, POCK, POCCL, POCBUN, POCHEMO, POCHCT in the last 72 hours. Coags: No results found for: PROTIME, INR, APTT    HCG (If Applicable): No results found for: PREGTESTUR, PREGSERUM, HCG, HCGQUANT     ABGs: No results found for: PHART, PO2ART, EQX2LRF, KVD2LNN, BEART, V3FLLSAF     Type & Screen (If Applicable):  No results found for: [facility]    Anesthesia Evaluation  Patient summary reviewed and Nursing notes reviewed no history of anesthetic complications:   Airway: Mallampati: II  TM distance: >3 FB   Neck ROM: full  Mouth opening: > = 3 FB Dental: normal exam         Pulmonary:normal exam  breath sounds clear to auscultation  (+) sleep apnea:                             Cardiovascular:Negative CV ROS          ECG reviewed  Rhythm: regular  Rate: normal                    Neuro/Psych:   Negative Neuro/Psych ROS              GI/Hepatic/Renal:   (+) GERD:,          ROS comment: Cholelithiasis. Endo/Other: Negative Endo/Other ROS                    Abdominal:           Vascular: negative vascular ROS. Anesthesia Plan      general     ASA 2       Induction: intravenous. MIPS: Postoperative opioids intended and Prophylactic antiemetics administered. Anesthetic plan and risks discussed with patient. Plan discussed with CRNA.                   Noman Huang MD   9/6/2018

## 2018-09-06 NOTE — ANESTHESIA POSTPROCEDURE EVALUATION
POST- ANESTHESIA EVALUATION       Pt Name: Sanford Calderon  MRN: [id number]  YOB: 1965  Date of evaluation: 9/6/2018  Time:  7:23 PM      /85   Pulse 71   Temp 96.7 °F (35.9 °C) (Axillary)   Resp 18   Ht 5' 10\" (1.778 m)   Wt 237 lb (107.5 kg)   SpO2 95%   BMI 34.01 kg/m²      Consciousness Level  Awake  Cardiopulmonary Status  Stable  Pain Adequately Treated YES  Nausea / Vomiting  NO  Adequate Hydration  YES  Anesthesia Related Complications NONE      Electronically signed by Ciaran Nguyen MD on 9/6/2018 at 7:23 PM       Department of Anesthesiology  Postprocedure Note    Patient: Sanford Calderon  MRN: [id number]  YOB: 1965  Date of evaluation: 9/6/2018  Time:  7:23 PM     Procedure Summary     Date:  09/06/18 Room / Location:  [address] [place] [address] [address]    Anesthesia Start:  6110 Anesthesia Stop:  0306    Procedure:  CHOLECYSTECTOMY LAPAROSCOPIC ROBOTIC W/LAPAROSCOPIC ROBOTIC LIVER BIOPSY (N/A ) Diagnosis:  (CHOLELITHIASIS )    Surgeon:  Maryjo Charles MD Responsible Provider:  Ciaran Nguyen MD    Anesthesia Type:  general ASA Status:  2          Anesthesia Type: general    Juliocesar Phase I: Juliocesar Score: 10    Juliocesar Phase II:      Last vitals: Reviewed and per EMR flowsheets.        Anesthesia Post Evaluation

## 2018-09-07 VITALS
DIASTOLIC BLOOD PRESSURE: 78 MMHG | WEIGHT: 254.41 LBS | RESPIRATION RATE: 16 BRPM | HEIGHT: 70 IN | SYSTOLIC BLOOD PRESSURE: 120 MMHG | TEMPERATURE: 97.6 F | BODY MASS INDEX: 36.42 KG/M2 | OXYGEN SATURATION: 98 % | HEART RATE: 73 BPM

## 2018-09-07 LAB
ABSOLUTE EOS #: 0 K/UL (ref 0–0.4)
ABSOLUTE IMMATURE GRANULOCYTE: ABNORMAL K/UL (ref 0–0.3)
ABSOLUTE LYMPH #: 0.8 K/UL (ref 1–4.8)
ABSOLUTE MONO #: 0.9 K/UL (ref 0.1–1.3)
ALBUMIN SERPL-MCNC: 3.6 G/DL (ref 3.5–5.2)
ALBUMIN/GLOBULIN RATIO: ABNORMAL (ref 1–2.5)
ALP BLD-CCNC: 36 U/L (ref 40–129)
ALT SERPL-CCNC: 103 U/L (ref 5–41)
ANION GAP SERPL CALCULATED.3IONS-SCNC: 10 MMOL/L (ref 9–17)
AST SERPL-CCNC: 70 U/L
BASOPHILS # BLD: 0 % (ref 0–2)
BASOPHILS ABSOLUTE: 0 K/UL (ref 0–0.2)
BILIRUB SERPL-MCNC: 1.14 MG/DL (ref 0.3–1.2)
BILIRUBIN DIRECT: 0.23 MG/DL
BILIRUBIN, INDIRECT: 0.91 MG/DL (ref 0–1)
BUN BLDV-MCNC: 13 MG/DL (ref 6–20)
BUN/CREAT BLD: ABNORMAL (ref 9–20)
CALCIUM SERPL-MCNC: 8.5 MG/DL (ref 8.6–10.4)
CHLORIDE BLD-SCNC: 103 MMOL/L (ref 98–107)
CO2: 24 MMOL/L (ref 20–31)
CREAT SERPL-MCNC: 1.03 MG/DL (ref 0.7–1.2)
DIFFERENTIAL TYPE: ABNORMAL
EOSINOPHILS RELATIVE PERCENT: 0 % (ref 0–4)
GFR AFRICAN AMERICAN: >60 ML/MIN
GFR NON-AFRICAN AMERICAN: >60 ML/MIN
GFR SERPL CREATININE-BSD FRML MDRD: ABNORMAL ML/MIN/{1.73_M2}
GFR SERPL CREATININE-BSD FRML MDRD: ABNORMAL ML/MIN/{1.73_M2}
GLOBULIN: ABNORMAL G/DL (ref 1.5–3.8)
GLUCOSE BLD-MCNC: 137 MG/DL (ref 70–99)
HCT VFR BLD CALC: 47 % (ref 41–53)
HEMOGLOBIN: 15.7 G/DL (ref 13.5–17.5)
IMMATURE GRANULOCYTES: ABNORMAL %
LYMPHOCYTES # BLD: 8 % (ref 24–44)
MCH RBC QN AUTO: 32 PG (ref 26–34)
MCHC RBC AUTO-ENTMCNC: 33.3 G/DL (ref 31–37)
MCV RBC AUTO: 95.9 FL (ref 80–100)
MONOCYTES # BLD: 9 % (ref 1–7)
NRBC AUTOMATED: ABNORMAL PER 100 WBC
PDW BLD-RTO: 13.3 % (ref 11.5–14.9)
PLATELET # BLD: 162 K/UL (ref 150–450)
PLATELET ESTIMATE: ABNORMAL
PMV BLD AUTO: 9.2 FL (ref 6–12)
POTASSIUM SERPL-SCNC: 4.2 MMOL/L (ref 3.7–5.3)
RBC # BLD: 4.9 M/UL (ref 4.5–5.9)
RBC # BLD: ABNORMAL 10*6/UL
SEG NEUTROPHILS: 83 % (ref 36–66)
SEGMENTED NEUTROPHILS ABSOLUTE COUNT: 8.3 K/UL (ref 1.3–9.1)
SODIUM BLD-SCNC: 137 MMOL/L (ref 135–144)
TOTAL PROTEIN: 6 G/DL (ref 6.4–8.3)
WBC # BLD: 10 K/UL (ref 3.5–11)
WBC # BLD: ABNORMAL 10*3/UL

## 2018-09-07 PROCEDURE — G0378 HOSPITAL OBSERVATION PER HR: HCPCS

## 2018-09-07 PROCEDURE — 96374 THER/PROPH/DIAG INJ IV PUSH: CPT

## 2018-09-07 PROCEDURE — 96376 TX/PRO/DX INJ SAME DRUG ADON: CPT

## 2018-09-07 PROCEDURE — 2580000003 HC RX 258: Performed by: SURGERY

## 2018-09-07 PROCEDURE — 80048 BASIC METABOLIC PNL TOTAL CA: CPT

## 2018-09-07 PROCEDURE — 6370000000 HC RX 637 (ALT 250 FOR IP): Performed by: SURGERY

## 2018-09-07 PROCEDURE — 36415 COLL VENOUS BLD VENIPUNCTURE: CPT

## 2018-09-07 PROCEDURE — 6360000002 HC RX W HCPCS: Performed by: SURGERY

## 2018-09-07 PROCEDURE — 85025 COMPLETE CBC W/AUTO DIFF WBC: CPT

## 2018-09-07 PROCEDURE — C9113 INJ PANTOPRAZOLE SODIUM, VIA: HCPCS | Performed by: SURGERY

## 2018-09-07 PROCEDURE — 96375 TX/PRO/DX INJ NEW DRUG ADDON: CPT

## 2018-09-07 PROCEDURE — 80076 HEPATIC FUNCTION PANEL: CPT

## 2018-09-07 PROCEDURE — 96366 THER/PROPH/DIAG IV INF ADDON: CPT

## 2018-09-07 RX ADMIN — OXYCODONE HYDROCHLORIDE AND ACETAMINOPHEN 2 TABLET: 5; 325 TABLET ORAL at 09:22

## 2018-09-07 RX ADMIN — PANTOPRAZOLE SODIUM 40 MG: 40 INJECTION, POWDER, FOR SOLUTION INTRAVENOUS at 08:44

## 2018-09-07 RX ADMIN — Medication 2 G: at 06:28

## 2018-09-07 RX ADMIN — HYDROMORPHONE HYDROCHLORIDE 0.5 MG: 1 INJECTION, SOLUTION INTRAMUSCULAR; INTRAVENOUS; SUBCUTANEOUS at 07:53

## 2018-09-07 RX ADMIN — METOCLOPRAMIDE 10 MG: 5 INJECTION, SOLUTION INTRAMUSCULAR; INTRAVENOUS at 05:04

## 2018-09-07 RX ADMIN — OXYCODONE HYDROCHLORIDE AND ACETAMINOPHEN 1 TABLET: 5; 325 TABLET ORAL at 00:35

## 2018-09-07 RX ADMIN — SODIUM CHLORIDE: 9 INJECTION, SOLUTION INTRAVENOUS at 06:30

## 2018-09-07 RX ADMIN — SODIUM CHLORIDE 10 ML: 9 INJECTION, SOLUTION INTRAMUSCULAR; INTRAVENOUS; SUBCUTANEOUS at 08:44

## 2018-09-07 RX ADMIN — OXYCODONE HYDROCHLORIDE AND ACETAMINOPHEN 2 TABLET: 5; 325 TABLET ORAL at 05:04

## 2018-09-07 ASSESSMENT — PAIN SCALES - GENERAL
PAINLEVEL_OUTOF10: 0
PAINLEVEL_OUTOF10: 7
PAINLEVEL_OUTOF10: 3
PAINLEVEL_OUTOF10: 6
PAINLEVEL_OUTOF10: 7
PAINLEVEL_OUTOF10: 6
PAINLEVEL_OUTOF10: 2
PAINLEVEL_OUTOF10: 2

## 2018-09-07 ASSESSMENT — PAIN DESCRIPTION - LOCATION
LOCATION: ABDOMEN
LOCATION: ABDOMEN

## 2018-09-07 ASSESSMENT — PAIN DESCRIPTION - PAIN TYPE
TYPE: SURGICAL PAIN
TYPE: SURGICAL PAIN

## 2018-09-07 NOTE — PROGRESS NOTES
Is at bedside, no continuous pulse ox needed room air st 98%
Page put out for Dr. Robert Aviles  regarding medical management at 2050 . Writer spoke with Dr. Robert Aviles regarding consult at 2115.
Pt rests peacefully
Infusions:   sodium chloride 150 mL/hr at 09/07/18 0630     PRN Meds:sodium chloride flush, acetaminophen, oxyCODONE-acetaminophen **OR** oxyCODONE-acetaminophen, HYDROmorphone **OR** HYDROmorphone, ondansetron    Objective:    Physical Exam:  Vitals: /78   Pulse 73   Temp 97.6 °F (36.4 °C) (Oral)   Resp 16   Ht 5' 10\" (1.778 m)   Wt 254 lb 6.6 oz (115.4 kg)   SpO2 98%   BMI 36.50 kg/m²   24 hour intake/output:  Intake/Output Summary (Last 24 hours) at 09/07/18 0811  Last data filed at 09/07/18 0350   Gross per 24 hour   Intake             4190 ml   Output              555 ml   Net             3635 ml     Last 3 weights: Wt Readings from Last 3 Encounters:   09/07/18 254 lb 6.6 oz (115.4 kg)   08/31/18 237 lb (107.5 kg)   03/19/18 223 lb (101.2 kg)       Physical Examination:   General appearance - alert, well appearing, and in no distress    Mental status - alert, oriented to person, place, and time  Heent-PERRLA wnl  Chest - clear to auscultation, no wheezes, rales or rhonchi, symmetric air entry  Heart - normal rate, regular rhythm, normal S1, S2, no murmurs, rubs, clicks or gallops  Abdomen - soft, tender ruq drain intact incision intact, nondistended, no masses or organomegaly  Neurological - alert, oriented, normal speech, no focal findings or movement disorder   Extremities - peripheral pulses normal, no pedal edema, no clubbing or cyanosis  Skin - normal coloration and turgor, no rashes, no suspicious skin lesions noted     Labs:-rev    CBC:   Recent Labs      09/07/18   0613   WBC  10.0   HGB  15.7   PLT  162     BMP:  Recent Labs      09/07/18   0613   NA  137   K  4.2   CL  103   CO2  24   BUN  13   CREATININE  1.03   GLUCOSE  137*     Glucose:No results for input(s): POCGLU in the last 72 hours. HgbA1C: No results for input(s): LABA1C in the last 72 hours. INR: No results for input(s): INR in the last 72 hours.   CARDIAC ENZYMES:No results for input(s): CKTOTAL, CKMB, CKMBINDEX, TROPONINI

## 2018-09-07 NOTE — CARE COORDINATION
CASE MANAGEMENT NOTE:    Admission Date:  9/6/2018 Zahra Lemus is a 50 y.o.  male    Admitted for : Cholecystitis, chronic [K81.1]    Met with:  Patient    PCP:  Dr. Slava Malave:  Nikhil Fowler:  independently at home             Current Services PTA:  No    Is patient agreeable to VNS: No    Freedom of choice provided: Yes    List of [address] provided: No    VNS chosen:  No    DME:  none    Home Oxygen: No    Nebulizer: No    CPAP/BIPAP: No    Supplier: N/A    Potential Assistance Needed: No    SNF needed: No    Pharmacy:  [address] [place] [address]       Does Patient want to use MEDS to BEDS? No    Family Members/Caregivers that pt would like involved in their care:    Yes    If yes, list name here:  Brother [address]    Transportation Provider:  Patient                      Discharge Plan:  9/7: [address] - Patient is from home independently. Denies DME. Denies any discharge needs. Should discharge to home later today.  //HARI                 Electronically signed by: Ben Marley RN on 9/7/2018 at 11:14 AM

## 2018-09-10 LAB — SURGICAL PATHOLOGY REPORT: NORMAL

## 2019-01-01 ENCOUNTER — HOSPITAL ENCOUNTER (EMERGENCY)
Age: 54
Discharge: HOME | End: 2019-01-01
Attending: EMERGENCY MEDICINE
Payer: COMMERCIAL

## 2019-01-01 ENCOUNTER — APPOINTMENT (OUTPATIENT)
Dept: CT IMAGING | Age: 54
End: 2019-01-01
Payer: COMMERCIAL

## 2019-01-01 VITALS
OXYGEN SATURATION: 97 % | WEIGHT: 240 LBS | HEART RATE: 84 BPM | BODY MASS INDEX: 34.36 KG/M2 | TEMPERATURE: 98 F | SYSTOLIC BLOOD PRESSURE: 158 MMHG | RESPIRATION RATE: 17 BRPM | DIASTOLIC BLOOD PRESSURE: 86 MMHG | HEIGHT: 70 IN

## 2019-01-01 DIAGNOSIS — S29.011A MUSCLE STRAIN OF CHEST WALL, INITIAL ENCOUNTER: ICD-10-CM

## 2019-01-01 DIAGNOSIS — R07.81 RIB PAIN ON RIGHT SIDE: Primary | ICD-10-CM

## 2019-01-01 LAB
ABSOLUTE EOS #: 0.4 K/UL (ref 0–0.4)
ABSOLUTE IMMATURE GRANULOCYTE: ABNORMAL K/UL (ref 0–0.3)
ABSOLUTE LYMPH #: 0.6 K/UL (ref 1–4.8)
ABSOLUTE MONO #: 0.3 K/UL (ref 0.1–1.3)
ANION GAP SERPL CALCULATED.3IONS-SCNC: 8 MMOL/L
BASOPHILS # BLD: 1 % (ref 0–2)
BASOPHILS ABSOLUTE: 0 K/UL (ref 0–0.2)
BUN BLDV-MCNC: 16 MG/DL (ref 6–20)
BUN/CREAT BLD: ABNORMAL (ref 9–20)
CALCIUM SERPL-MCNC: 9.8 MG/DL (ref 8.6–10.4)
CHLORIDE BLD-SCNC: 100 MMOL/L (ref 98–107)
CO2: 29 MMOL/L (ref 20–31)
CREAT SERPL-MCNC: 0.97 MG/DL (ref 0.7–1.2)
DIFFERENTIAL TYPE: ABNORMAL
EOSINOPHILS RELATIVE PERCENT: 7 % (ref 0–4)
GFR AFRICAN AMERICAN: >60 ML/MIN
GFR NON-AFRICAN AMERICAN: >60 ML/MIN
GFR SERPL CREATININE-BSD FRML MDRD: ABNORMAL ML/MIN/{1.73_M2}
GFR SERPL CREATININE-BSD FRML MDRD: ABNORMAL ML/MIN/{1.73_M2}
GLUCOSE BLD-MCNC: 122 MG/DL (ref 70–99)
HCT VFR BLD CALC: 55.2 % (ref 41–53)
HEMOGLOBIN: 18.8 G/DL (ref 13.5–17.5)
IMMATURE GRANULOCYTES: ABNORMAL %
LYMPHOCYTES # BLD: 11 % (ref 24–44)
MCH RBC QN AUTO: 31.1 PG (ref 26–34)
MCHC RBC AUTO-ENTMCNC: 34.2 G/DL (ref 31–37)
MCV RBC AUTO: 91.1 FL (ref 80–100)
MONOCYTES # BLD: 5 % (ref 1–7)
NRBC AUTOMATED: ABNORMAL PER 100 WBC
PDW BLD-RTO: 13.5 % (ref 11.5–14.9)
PLATELET # BLD: 168 K/UL (ref 150–450)
PLATELET ESTIMATE: ABNORMAL
PMV BLD AUTO: 8.9 FL (ref 6–12)
POTASSIUM SERPL-SCNC: 4.8 MMOL/L (ref 3.7–5.3)
RBC # BLD: 6.06 M/UL (ref 4.5–5.9)
RBC # BLD: ABNORMAL 10*6/UL
SEG NEUTROPHILS: 76 % (ref 36–66)
SEGMENTED NEUTROPHILS ABSOLUTE COUNT: 4.4 K/UL (ref 1.3–9.1)
SODIUM BLD-SCNC: 137 MMOL/L (ref 135–144)
WBC # BLD: 5.8 K/UL (ref 3.5–11)
WBC # BLD: ABNORMAL 10*3/UL

## 2019-01-01 PROCEDURE — 71260 CT THORAX DX C+: CPT

## 2019-01-01 PROCEDURE — 6360000004 HC RX CONTRAST MEDICATION: Performed by: NURSE PRACTITIONER

## 2019-01-01 PROCEDURE — 6360000002 HC RX W HCPCS: Performed by: NURSE PRACTITIONER

## 2019-01-01 PROCEDURE — 80048 BASIC METABOLIC PNL TOTAL CA: CPT

## 2019-01-01 PROCEDURE — 85025 COMPLETE CBC W/AUTO DIFF WBC: CPT

## 2019-01-01 PROCEDURE — 36415 COLL VENOUS BLD VENIPUNCTURE: CPT

## 2019-01-01 PROCEDURE — 96374 THER/PROPH/DIAG INJ IV PUSH: CPT

## 2019-01-01 PROCEDURE — 6370000000 HC RX 637 (ALT 250 FOR IP): Performed by: NURSE PRACTITIONER

## 2019-01-01 PROCEDURE — 2580000003 HC RX 258: Performed by: NURSE PRACTITIONER

## 2019-01-01 PROCEDURE — 99284 EMERGENCY DEPT VISIT MOD MDM: CPT

## 2019-01-01 RX ORDER — CYCLOBENZAPRINE HCL 10 MG
10 TABLET ORAL 3 TIMES DAILY PRN
Qty: 15 TABLET | Refills: 0 | Status: SHIPPED | OUTPATIENT
Start: 2019-01-01 | End: 2021-10-27

## 2019-01-01 RX ORDER — HYDROCODONE BITARTRATE AND ACETAMINOPHEN 5; 325 MG/1; MG/1
1 TABLET ORAL EVERY 8 HOURS PRN
Qty: 12 TABLET | Refills: 0 | Status: SHIPPED | OUTPATIENT
Start: 2019-01-01 | End: 2019-01-05

## 2019-01-01 RX ORDER — MORPHINE SULFATE 4 MG/ML
4 INJECTION, SOLUTION INTRAMUSCULAR; INTRAVENOUS ONCE
Status: COMPLETED | OUTPATIENT
Start: 2019-01-01 | End: 2019-01-01

## 2019-01-01 RX ORDER — SODIUM CHLORIDE 0.9 % (FLUSH) 0.9 %
10 SYRINGE (ML) INJECTION PRN
Status: DISCONTINUED | OUTPATIENT
Start: 2019-01-01 | End: 2019-01-01 | Stop reason: HOSPADM

## 2019-01-01 RX ORDER — 0.9 % SODIUM CHLORIDE 0.9 %
80 INTRAVENOUS SOLUTION INTRAVENOUS ONCE
Status: COMPLETED | OUTPATIENT
Start: 2019-01-01 | End: 2019-01-01

## 2019-01-01 RX ORDER — HYDROCODONE BITARTRATE AND ACETAMINOPHEN 5; 325 MG/1; MG/1
2 TABLET ORAL ONCE
Status: COMPLETED | OUTPATIENT
Start: 2019-01-01 | End: 2019-01-01

## 2019-01-01 RX ADMIN — SODIUM CHLORIDE 80 ML: 9 INJECTION, SOLUTION INTRAVENOUS at 18:11

## 2019-01-01 RX ADMIN — IOPAMIDOL 75 ML: 755 INJECTION, SOLUTION INTRAVENOUS at 18:11

## 2019-01-01 RX ADMIN — MORPHINE SULFATE 4 MG: 4 INJECTION, SOLUTION INTRAMUSCULAR; INTRAVENOUS at 16:51

## 2019-01-01 RX ADMIN — Medication 10 ML: at 18:11

## 2019-01-01 RX ADMIN — HYDROCODONE BITARTRATE AND ACETAMINOPHEN 2 TABLET: 5; 325 TABLET ORAL at 19:38

## 2019-01-01 ASSESSMENT — PAIN DESCRIPTION - LOCATION
LOCATION: RIB CAGE
LOCATION: CHEST;RIB CAGE

## 2019-01-01 ASSESSMENT — PAIN DESCRIPTION - PAIN TYPE
TYPE: ACUTE PAIN
TYPE: ACUTE PAIN

## 2019-01-01 ASSESSMENT — PAIN SCALES - GENERAL
PAINLEVEL_OUTOF10: 7
PAINLEVEL_OUTOF10: 10
PAINLEVEL_OUTOF10: 10
PAINLEVEL_OUTOF10: 6

## 2019-01-01 ASSESSMENT — ENCOUNTER SYMPTOMS
TROUBLE SWALLOWING: 0
NAUSEA: 0
VOMITING: 0
SHORTNESS OF BREATH: 0
COUGH: 0

## 2019-01-01 ASSESSMENT — PAIN DESCRIPTION - PROGRESSION: CLINICAL_PROGRESSION: GRADUALLY IMPROVING

## 2019-01-01 ASSESSMENT — PAIN DESCRIPTION - ORIENTATION
ORIENTATION: RIGHT
ORIENTATION: RIGHT

## 2019-01-01 ASSESSMENT — PAIN DESCRIPTION - FREQUENCY: FREQUENCY: CONTINUOUS

## 2019-01-01 ASSESSMENT — PAIN DESCRIPTION - DESCRIPTORS: DESCRIPTORS: SHARP

## 2021-10-27 ENCOUNTER — OFFICE VISIT (OUTPATIENT)
Dept: PODIATRY | Age: 56
End: 2021-10-27
Payer: COMMERCIAL

## 2021-10-27 VITALS — WEIGHT: 240 LBS | BODY MASS INDEX: 34.36 KG/M2 | HEIGHT: 70 IN

## 2021-10-27 DIAGNOSIS — M79.672 LEFT FOOT PAIN: ICD-10-CM

## 2021-10-27 DIAGNOSIS — M72.2 PLANTAR FASCIAL FIBROMATOSIS: Primary | ICD-10-CM

## 2021-10-27 PROCEDURE — 20550 NJX 1 TENDON SHEATH/LIGAMENT: CPT | Performed by: PODIATRIST

## 2021-10-27 PROCEDURE — 99203 OFFICE O/P NEW LOW 30 MIN: CPT | Performed by: PODIATRIST

## 2021-10-27 RX ORDER — BETAMETHASONE SODIUM PHOSPHATE AND BETAMETHASONE ACETATE 3; 3 MG/ML; MG/ML
6 INJECTION, SUSPENSION INTRA-ARTICULAR; INTRALESIONAL; INTRAMUSCULAR; SOFT TISSUE ONCE
Status: COMPLETED | OUTPATIENT
Start: 2021-10-27 | End: 2021-11-24

## 2021-10-27 NOTE — PROGRESS NOTES
[address] [facility] [address] [id number] [facility] 36.  Dept: [phone]    NEW PATIENT PROGRESS NOTE  Date of patient's visit: 10/27/2021  Patient's Name:  Erica Murphy YOB: 1965            Patient Care Team:  Kaiser Helm DO as PCP - General (Family Medicine)        Chief Complaint   Patient presents with    New Patient    Foot Pain     left foot pain 8-10 weeks    Benign Neoplasm     bl         HPI:   Erica Murphy is a 49 y.o. male who presents to the office today complaining of left foot pain. Symptoms began 8-10 week(s) ago. Patient relates pain is Present. Pain is rated 2 out of 10 and is described as intermittent. Treatments prior to today's visit include: tylenol and ibuprofen for pain. Currently denies F/C/N/V. Pt's primary care physician is Kaiser Helm DO last seen July 23 2021     Allergies   Allergen Reactions    Codeine Other (See Comments)     Makes heart race       Past Medical History:   Diagnosis Date    Acid reflux     went away after losing weight    Apertognathia     Had 3 surgeries to repair jaw issue after 3 attempts to correct sleep apnea    Cholelithiasis     found during ultrasound    Elevated liver enzymes     History of hypertension     lost weight after apnea surgery and HTN is no longer and issue    Sleep apnea     had total of 6 surgeries to repair issue    Wears glasses        Prior to Admission medications    Not on File       Past Surgical History:   Procedure Laterality Date   [address] [facility]    repaired external left carotid artery that the repair stitch blew out when patient was eating bread.     CHOLECYSTECTOMY      COLONOSCOPY  2005    ENDOSCOPY, COLON, DIAGNOSTIC      MANDIBLE SURGERY  12/04/2012    at [facility] jaw and pull it forward to fix sleep apnea with external fixation placed   [address] HISTORY  2004    radio frequency surgery to shrink tongue size for sleep apnea    OTHER SURGICAL HISTORY  2008    cable suspension surgery to pull tongue forward    OTHER SURGICAL HISTORY  07/02/2013    removal of mandible externa fixation hardware at [facility]    [facility] COLON CA SCRN NOT HI RSK IND N/A 3/19/2018    COLONOSCOPY performed by Vani Hicks MD at [address] N/A 9/6/2018    CHOLECYSTECTOMY LAPAROSCOPIC ROBOTIC W/LAPAROSCOPIC ROBOTIC LIVER BIOPSY performed [facility] at [facility]      taken with UPPP    TRACHEOSTOMY  11/05/2012    was closed about 4 months after surgery in office    UPPER GASTROINTESTINAL ENDOSCOPY     Jong Shade  [phone]    at [facility] multiple times, kept going further each time, carotid artery was hit the last time and stitched up during the surgery       Family History   Problem Relation Age of Onset    Cancer Father         skin    Cancer Brother         half brother - prostate cancer       Social History     Tobacco Use    Smoking status: Never Smoker    Smokeless tobacco: Never Used   Substance Use Topics    Alcohol use: Yes     Alcohol/week: 8.0 standard drinks     Types: 8 Cans of beer per week       Review of Systems    Review of Systems:   History obtained from chart review and the patient  General ROS: negative for - chills, fatigue, fever, night sweats or weight gain  Constitutional: Negative for chills, diaphoresis, fatigue, fever and unexpected weight change. Musculoskeletal: Positive for arthralgias, gait problem and joint swelling. Neurological ROS: negative for - behavioral changes, confusion, headaches or seizures. Negative for weakness and numbness. Dermatological ROS: negative for - mole changes, rash  Cardiovascular: Negative for leg swelling. Gastrointestinal: Negative for constipation, diarrhea, nausea and vomiting. Lower Extremity Physical Examination:   Vitals: There were no vitals filed for this visit.   General: AAO x 3 in injection was informed consisting of a 3:1 mix of 0.5% Marcaine plain and celestone (6MG/ML). The patient was educated about the possibility of steroid flair. The patient is to call any questions, comments, or concerns. Arch Pain: Exercises  Introduction  Here are some examples of exercises for you to try. The exercises may be suggested for a condition or for rehabilitation. Start each exercise slowly. Ease off the exercises if you start to have pain. You will be told when to start these exercises and which ones will work best for you. How to do the exercises  Plantar fascia stretch    1. Sit in a chair and put your affected foot on your other knee. 2. Hold the heel of your foot in one hand, and grasp your toes with the other hand. 3. Pull on your heel (toward your body), and at the same time pull your toes back with your other hand. 4. You should feel a stretch along the bottom of your foot. 5. Hold 15 to 30 seconds. 6. Repeat 2 to 4 times. Plantar fascia stretch (kneeling)    1. Get on your hands and knees on the floor. Keep your heels pointing up and the balls of your feet and your toes on the floor. 2. Slowly sit back toward your ankles. 3. If this is too hard, you can try doing it one leg at a time. Stand up, and then kneel on one knee and keep the other leg forward. Place the foot of your forward leg flat on the ground and bend that knee. The heel on the leg still behind you should point up. The ball and toes of that foot should be on the floor. Sit back toward that ankle. 4. Hold 15 to 30 seconds. 5. Repeat 2 to 4 times. Switch legs if you are doing this one leg at a time. Plantar fascia self-massage    1. Sit in a chair. 2. Place your affected foot on a firm, tube-shaped object, such as a can or water bottle. 3. Roll your foot back and forth over the object to massage the bottom of your foot.   4. If you want to do ice massage, fill a water bottle about three-fourths of the way full and freeze before using. 5. Continue for 2 to 5 minutes. Bilateral calf stretch (knees straight)    1. Place a book on the floor a few inches from a wall or countertop, and put the balls of your feet on it. Your heels should be on the floor. The book needs to be thick enough so that you can feel a gentle stretch in each calf. If you are not steady on your feet, hold on to a chair, counter, or wall while you do this stretch. 2. Keep your knees straight, and lean forward until you feel a stretch in each calf. 3. To get more stretch, add another book or use a thicker book, such as a phone book, a dictionary, or an encyclopedia. 4. Hold the stretch for at least 15 to 30 seconds. 5. Repeat 2 to 4 times. Bilateral calf stretch (knees bent)    1. Place a book on the floor a few inches from a wall or countertop, and put the balls of your feet on it. Your heels should be on the floor. The book needs to be thick enough so that you can feel a gentle stretch in each calf. If you are not steady on your feet, hold on to a chair, counter, or wall while you do this stretch. 2. Bend your knees, and lean forward until you feel a stretch in each calf. 3. To get more stretch, add another book or use a thicker book, such as a phone book, a dictionary, or an encyclopedia. 4. Hold the stretch for at least 15 to 30 seconds. 5. Repeat 2 to 4 times. [place] pick-ups    1. Put some marbles on the floor next to a cup.  2. Sit down, and use the toes of your affected foot to lift up one marble from the floor at a time. Then try to put the marble in the cup.  3. Repeat 8 to 12 times. Towel scrunches    1. Sit down, and place your affected foot on a towel on the floor. You may also do this with both feet on the towel. 2. Scrunch the towel toward you with your toes. Then use your toes to push the towel back into place. 3. Repeat 8 to 12 times. Heel raises on a step    1.  Stand on the bottom step of a staircase, facing up toward the stairs. Put the balls of your feet on the step. If you are not steady on your feet, hold on to the banister or wall. 2. Keeping both knees straight, slowly lift your heels above the step so that you are standing on your toes. Then slowly lower your heels below the step and toward the floor. 3. Return to the starting position, with your feet even with the step. 4. Repeat 8 to 12 times. Follow-up care is a key part of your treatment and safety. Be sure to make and go to all appointments, and call your doctor if you are having problems. It's also a good idea to know your test results and keep a list of the medicines you take. Where can you learn more? Go to https://Firework.Snapette. org and sign in to your CloudBeds account. Enter H119 in the Truly Accomplished box to learn more about \"Arch Pain: Exercises. \"     If you do not have an account, please click on the \"Sign Up Now\" link. Current as of: September 20, 2018  Content Version: 12.1  © 2162-0404 Healthwise, Incorporated. Care instructions adapted under license by [facility] ([facility]). If you have questions about a medical condition or this instruction, always ask your healthcare professional. [place] [address] any warranty or liability for your use of this information. .  Verbal and written instructions given to patient. Contact office with any questions/problems/concerns. RTC in 2week(s).     10/27/2021    Electronically signed by Wili Hester DPM on 10/27/2021 at 2:54 PM  10/27/2021

## 2021-11-24 RX ADMIN — BETAMETHASONE SODIUM PHOSPHATE AND BETAMETHASONE ACETATE 6 MG: 3; 3 INJECTION, SUSPENSION INTRA-ARTICULAR; INTRALESIONAL; INTRAMUSCULAR; SOFT TISSUE at 10:52

## 2024-12-18 ENCOUNTER — OFFICE VISIT (OUTPATIENT)
Age: 59
End: 2024-12-18
Payer: COMMERCIAL

## 2024-12-18 VITALS
HEIGHT: 70 IN | TEMPERATURE: 98.1 F | OXYGEN SATURATION: 96 % | SYSTOLIC BLOOD PRESSURE: 144 MMHG | DIASTOLIC BLOOD PRESSURE: 94 MMHG | BODY MASS INDEX: 37.08 KG/M2 | HEART RATE: 99 BPM | WEIGHT: 259 LBS

## 2024-12-18 DIAGNOSIS — K64.4 EXTERNAL HEMORRHOID: ICD-10-CM

## 2024-12-18 DIAGNOSIS — I10 ESSENTIAL HYPERTENSION: ICD-10-CM

## 2024-12-18 DIAGNOSIS — K64.8 INTERNAL HEMORRHOID: ICD-10-CM

## 2024-12-18 DIAGNOSIS — K62.5 RECTAL BLEEDING: Primary | ICD-10-CM

## 2024-12-18 DIAGNOSIS — K57.30 SIGMOID DIVERTICULOSIS: ICD-10-CM

## 2024-12-18 PROCEDURE — 3080F DIAST BP >= 90 MM HG: CPT | Performed by: SURGERY

## 2024-12-18 PROCEDURE — 3077F SYST BP >= 140 MM HG: CPT | Performed by: SURGERY

## 2024-12-18 PROCEDURE — 99204 OFFICE O/P NEW MOD 45 MIN: CPT | Performed by: SURGERY

## 2024-12-18 RX ORDER — VALSARTAN 80 MG/1
80 TABLET ORAL DAILY
COMMUNITY
Start: 2024-11-08

## 2024-12-18 RX ORDER — PHENTERMINE HYDROCHLORIDE 37.5 MG/1
37.5 TABLET ORAL
COMMUNITY
Start: 2024-12-12 | End: 2025-01-11

## 2024-12-18 RX ORDER — SILDENAFIL 100 MG/1
100 TABLET, FILM COATED ORAL DAILY PRN
COMMUNITY
Start: 2024-11-08

## 2024-12-18 RX ORDER — ASPIRIN 325 MG
325 TABLET ORAL DAILY
COMMUNITY

## 2024-12-18 RX ORDER — ACETAMINOPHEN 325 MG/1
650 TABLET ORAL EVERY 6 HOURS PRN
COMMUNITY

## 2024-12-18 NOTE — PATIENT INSTRUCTIONS
The office will be calling you soon to schedule procedure, please call our office if you do not receive a phone call within 1 week.     [facility]  Garth Aguirre MD, FACS  Claritza Pittman, ISACC-CNP  [facility], [address]  [place] [address]  Phone: [phone]  Fax: [phone]    Miralax (Polyethylene Glycol)  STOP Aspirin 7 Days prior to Procedure  STOP all other Blood Thinners 5 Days prior to Procedure    **DO NOT EAT ANY SOLID FOOD THE DAY BEFORE YOUR PROCEDURE**  **YOU MUST BE ON A CLEAR LIQUID DIET ONLY**    Approved Clear Liquids:  Any flavor of soda except Red or Purple  Fruit Juice without pulp  Coffee or tea without dairy products  Jell-O without fruit or toppings, No Red or Purple  Pop-alexandra or Italian Ice, No Red or Purple  Chicken or Beef broth and bouillon      DRINK PLENTY OF WATER THROUGHOUT THE DAY    At 10:00 am the day before your procedure, take all 4 Dulcolax Tablets.  At 6:00 pm the day before your procedure, mix the ENTIRE bottle of Miralax (238 gram or Close to it) with 64 ounces of Gatorade (NOT RED or PURPLE).  You must consume the entire 64 ounces by 8:00 pm.  Continue clear liquid diet up until midnight.    NOTHING TO EAT, DRINK, SMOKE, OR CHEW AFTER MIDNIGHT    You may brush your teeth, rinse, gargle, and spit.  Heart or Blood pressure medications ONLY with a small sip of water, unless otherwise directed.  You will be scheduled for a pre-admission phone call prior to your procedure for your chart to be reviewed with a nurse to give you more specific instructions.  Shower with regular soap and water.    YOU MUST HAVE AN ADULT EITHER DRIVE YOU OR RIDE IN A CAB WITH YOU

## 2024-12-19 ENCOUNTER — TELEPHONE (OUTPATIENT)
Age: 59
End: 2024-12-19

## 2024-12-19 ENCOUNTER — PREP FOR PROCEDURE (OUTPATIENT)
Age: 59
End: 2024-12-19

## 2024-12-19 DIAGNOSIS — K62.5 RECTAL BLEEDING: ICD-10-CM

## 2024-12-19 PROBLEM — K64.9 HEMORRHOIDS: Status: ACTIVE | Noted: 2024-12-19

## 2024-12-19 RX ORDER — BISACODYL 5 MG/1
TABLET, DELAYED RELEASE ORAL
Qty: 4 TABLET | Refills: 0 | Status: SHIPPED | OUTPATIENT
Start: 2024-12-19

## 2024-12-19 RX ORDER — ONDANSETRON 4 MG/1
4 TABLET, FILM COATED ORAL EVERY 6 HOURS PRN
Qty: 10 TABLET | Refills: 0 | Status: SHIPPED | OUTPATIENT
Start: 2024-12-19

## 2024-12-19 RX ORDER — POLYETHYLENE GLYCOL 3350 17 G/17G
POWDER, FOR SOLUTION ORAL
Qty: 238 G | Refills: 0 | Status: SHIPPED | OUTPATIENT
Start: 2024-12-19

## 2024-12-19 ASSESSMENT — ENCOUNTER SYMPTOMS
SHORTNESS OF BREATH: 0
COUGH: 0
RHINORRHEA: 0
SORE THROAT: 0

## 2024-12-19 NOTE — TELEPHONE ENCOUNTER
Spoke with patient to schedule procedure  Pre Op and Bowel Prep instructions discussed and will mail to patient  Prep to be ordered thru [facility] in [place]    EM/SC/25 at 10:15am/Diagnostic Colonoscopy, EUA, Possible Hemorrhoidectomy/epic      PRE OP AND BOWEL PREP INSTRUCTIONS    STOP Aspirin 7 Days prior to Procedure  STOP all other Blood Thinners 5 Days prior to Procedure      **DO NOT EAT ANY SOLID FOOD THE DAY BEFORE YOUR PROCEDURE**  **YOU MUST BE ON A CLEAR LIQUID DIET ONLY**    Approved Clear Liquids:  Any flavor of water or soda except Red or Purple  Fruit Juice without pulp  Coffee or tea without dairy products  Jell-O without fruit or toppings, No Red or Purple  Pop-alexandra or Italian Ice, No Red or Purple  Chicken or Beef broth and bouillon      DRINK PLENTY OF WATER THROUGHOUT THE DAY    At 10:00 am the day before your procedure, take all 4 Dulcolax Tablets.  At 6:00 pm the day before your procedure, mix the ENTIRE bottle of Miralax (238 gram or Close to it) with 64 ounces of Gatorade, Gatorade Zero or Propel Water (NOT RED or PURPLE).  You must consume the entire 64 ounces by 8:00 pm.  Continue clear liquid diet up until midnight.    NOTHING TO EAT, DRINK, SMOKE, OR CHEW AFTER MIDNIGHT    You may brush your teeth, rinse, gargle, and spit.  Heart or Blood pressure medications ONLY with a small sip of water, unless otherwise directed.  Shower with regular soap and water.    YOU MUST HAVE AN ADULT EITHER DRIVE YOU OR RIDE IN A CAB WITH YOU        MY PROCEDURE IS SCHEDULED FOR:  DIAGNOSTIC COLONOSCOPY, EUA, POSSIBLE HEMORRHOIDECTOMY    Date of Procedure: 25  Time: 10:15am  Arrival Time: 8:15am  Location:  [facility]   Your scripts have been sent to: [facility] [place]  Pre-Admission Testin25 at 8:15am  Follow Up Appointment at [facility] General Surgery with Dr Aguirre:  25 at 3:45pm

## 2024-12-19 NOTE — PROGRESS NOTES
system dilatation.  The ureters are unremarkable.  GI TRACT: No bowel dilation, discrete areas of wall thickening, or surrounding inflammatory changes. Visualized appendix is unremarkable.  MESENTERY: The mesentery is unremarkable.  PELVIC ORGANS: Mildly enlarged prostate.  The seminal vesicles are normal.  VESSELS: The abdominal aorta caliber is normal. There is mild plaque in the abdominal aorta.  LYMPH NODES: No lymphadenopathy.  OTHER: No evidence of free air or free fluid.  Small right-sided fat-containing inguinal hernia.  SOFT TISSUES: The soft tissues are unremarkable.  BONES: Multilevel degenerative changes of the spine, most pronounced at L5-S1.  No aggressive bone lesions.  IMPRESSION:  *  No evidence of acute intra-abdominal or pelvic process, by CT.  *  5 mm nonobstructing calculus in the right upper renal pole.  *  Benign 1.4 cm left adrenal nodule, consistent with a lipid rich adenoma or adrenal myelolipoma. Recommend no further evaluation.  Jann ROACH, et al. Management of Incidental Adrenal Masses: A White Paper of the ACR Incidental Findings Committee. J Am Winter Radiol. 2017 Aug;14(8):4857-3337.  Approved by Resident Hollis Griffin MD  on 5/3/2024 7:10 AM  Raheel RAMESH have personally reviewed the image(s) and agree with and/or edited the report  Workstation:[id number]  Finalized by Raheel Morales on 5/3/2024 9:19 AM        ASSESSMENT   59 y.o. male with rectal bleeding  Previous colonoscopy in 2018 evidence of sigmoid diverticulosis prominent internal/external hemorrhoids  History of hypertension.  Does not take any blood thinners.    PLAN  Preadmission testing at [facility].  Patient does not take any blood thinners.  Bowel prep.  Please schedule the patient for colonoscopy with examination under general anesthesia possible hemorrhoidectomy.  Procedure risk complications risk-benefit alternatives recovery restrictions were all discussed with the patient at length.  He

## 2025-01-14 NOTE — H&P
HISTORY and PHYSICAL  [facility]       NAME:  Praful Stringer  MRN: [id number]   YOB: 1965   Date: 1/17/2025   Age: 59 y.o.  Gender: male     COMPLAINT AND PRESENT HISTORY:   Praful Stringer is 59 y.o.,  male, presents for pre-anesthesia/admission testing for DIAGNOSTIC COLONOSCOPY WITH EXAMINATION UNDER ANESTHESIA and HEMORRHOIDECTOMY  per Dr. Aguirre.  Primary dx: Rectal bleeding [K62.5] Hemorrhoids [K64.9]    Office visit note per Dr Aguirre on 12/18/2024  HISTORY OF PRESENT ILLNESS: 59 y.o. male presents with rectal bleeding.  Blood is bright red in nature.  Patient has had a colonoscopy in 2018 that revealed evidence of sigmoid diverticulosis prominent internal/external hemorrhoids.  Does not take any blood thinners.  History of hypertension.     Colonoscopy:  Last Colonoscopy Date (if applicable): 2018  Hx of Colon Polyps: No  Rectal Bleeding: Yes  Abdominal Pain: No  Changes in Bowel Habits: No  Nausea / Vomiting: No  Weight Loss: No  Family Hx of Colon CA: No  Exposure to ATB's: No  Travel: No  Hx of IBD: No    UPDATE 1/14/2025  Praful Stringer is 59 y.o.,   male, presents for pre-anesthesia/admission testing for DIAGNOSTIC COLONOSCOPY WITH EXAMINATION UNDER ANESTHESIA and HEMORRHOIDECTOMY  per Dr. Aguirre.  Primary dx: Rectal bleeding [K62.5] Hemorrhoids [K64.9]    Pt also has hx of Diverticulosis.      Patient denies any  FH of Colon Cancer.      Patient reports GI /Rectal bleeding, experiencing red stools with h/o hemorrhoids.  Denies dark tarry stools.     Patient denies any Dysphagia.  Pt has hx of GERD    Pt is on a PPI./ Antacid, that is helping to control symptoms.      Patient denies any other GI symptoms. No nausea / vomiting.  Occasional diarrhea. Occasional constipation. No abdominal pains or cramping.   No fever or chills.  Denies recent chest pain or SOB      Review of additional significant medical hx:  Pt follows with Liliya Ramos NP Last visit on  WITH EXAMINATION UNDER ANESTHESIA and HEMORRHOIDECTOMY per Dr. Aguirre.  Primary dx: Rectal bleeding [K62.5] Hemorrhoids [K64.9]    Patient Active Problem List    Diagnosis Date Noted    Rectal bleeding 12/19/2024    Hemorrhoids 12/19/2024    Cholecystitis, chronic 09/06/2018           CLEARANCE: Dr. Martinez, anesthesia, was contacted and informed of patient's history and planned surgery.  Medical clearance required for scheduled surgery d/t the following issues which are discussed in H&P above:  Control of blood pressure  Dr. Aguirre's office will be responsible for making sure the clearance is obtained and is in the chart for surgery.     Total time spent on encounter- PAT provider minutes: 21-30 minutes     ISACC Carter - CNP on 1/17/2025 at 10:11 AM

## 2025-01-14 NOTE — H&P (VIEW-ONLY)
WITH EXAMINATION UNDER ANESTHESIA and HEMORRHOIDECTOMY per Dr. Aguirre.  Primary dx: Rectal bleeding [K62.5] Hemorrhoids [K64.9]    Patient Active Problem List    Diagnosis Date Noted    Rectal bleeding 12/19/2024    Hemorrhoids 12/19/2024    Cholecystitis, chronic 09/06/2018           CLEARANCE: Dr. Martinez, anesthesia, was contacted and informed of patient's history and planned surgery.  Medical clearance required for scheduled surgery d/t the following issues which are discussed in H&P above:  Control of blood pressure  Dr. Aguirre's office will be responsible for making sure the clearance is obtained and is in the chart for surgery.     Total time spent on encounter- PAT provider minutes: 21-30 minutes     ISACC Carter - CNP on 1/17/2025 at 10:11 AM

## 2025-01-17 ENCOUNTER — HOSPITAL ENCOUNTER (OUTPATIENT)
Dept: PREADMISSION TESTING | Age: 60
Discharge: HOME | End: 2025-01-21
Payer: COMMERCIAL

## 2025-01-17 VITALS
HEIGHT: 70 IN | HEART RATE: 91 BPM | BODY MASS INDEX: 34.65 KG/M2 | RESPIRATION RATE: 18 BRPM | TEMPERATURE: 99.2 F | DIASTOLIC BLOOD PRESSURE: 101 MMHG | WEIGHT: 242 LBS | OXYGEN SATURATION: 96 % | SYSTOLIC BLOOD PRESSURE: 126 MMHG

## 2025-01-17 LAB
ANION GAP SERPL CALCULATED.3IONS-SCNC: 10 MMOL/L (ref 9–16)
BASOPHILS # BLD: 0.1 K/UL (ref 0–0.2)
BASOPHILS NFR BLD: 1 % (ref 0–2)
BUN SERPL-MCNC: 19 MG/DL (ref 6–20)
CALCIUM SERPL-MCNC: 9.7 MG/DL (ref 8.6–10.4)
CHLORIDE SERPL-SCNC: 100 MMOL/L (ref 98–107)
CO2 SERPL-SCNC: 30 MMOL/L (ref 20–31)
CREAT SERPL-MCNC: 1.2 MG/DL (ref 0.7–1.2)
EOSINOPHIL # BLD: 0.1 K/UL (ref 0–0.4)
EOSINOPHILS RELATIVE PERCENT: 1 % (ref 0–4)
ERYTHROCYTE [DISTWIDTH] IN BLOOD BY AUTOMATED COUNT: 13.7 % (ref 11.5–14.9)
GFR, ESTIMATED: 71 ML/MIN/1.73M2
GLUCOSE SERPL-MCNC: 114 MG/DL (ref 74–99)
HCT VFR BLD AUTO: 53.2 % (ref 41–53)
HGB BLD-MCNC: 18.1 G/DL (ref 13.5–17.5)
LYMPHOCYTES NFR BLD: 1.1 K/UL (ref 1–4.8)
LYMPHOCYTES RELATIVE PERCENT: 19 % (ref 24–44)
MCH RBC QN AUTO: 30.9 PG (ref 26–34)
MCHC RBC AUTO-ENTMCNC: 34.1 G/DL (ref 31–37)
MCV RBC AUTO: 90.5 FL (ref 80–100)
MONOCYTES NFR BLD: 0.6 K/UL (ref 0.1–1.3)
MONOCYTES NFR BLD: 11 % (ref 1–7)
NEUTROPHILS NFR BLD: 68 % (ref 36–66)
NEUTS SEG NFR BLD: 4 K/UL (ref 1.3–9.1)
PLATELET # BLD AUTO: 178 K/UL (ref 150–450)
PMV BLD AUTO: 8.8 FL (ref 6–12)
POTASSIUM SERPL-SCNC: 4.5 MMOL/L (ref 3.7–5.3)
RBC # BLD AUTO: 5.87 M/UL (ref 4.5–5.9)
SODIUM SERPL-SCNC: 140 MMOL/L (ref 136–145)
WBC OTHER # BLD: 5.8 K/UL (ref 3.5–11)

## 2025-01-17 PROCEDURE — 80048 BASIC METABOLIC PNL TOTAL CA: CPT

## 2025-01-17 PROCEDURE — 36415 COLL VENOUS BLD VENIPUNCTURE: CPT

## 2025-01-17 PROCEDURE — APPSS45 APP SPLIT SHARED TIME 31-45 MINUTES: Performed by: NURSE PRACTITIONER

## 2025-01-17 PROCEDURE — 85025 COMPLETE CBC W/AUTO DIFF WBC: CPT

## 2025-01-17 PROCEDURE — 93005 ELECTROCARDIOGRAM TRACING: CPT | Performed by: ANESTHESIOLOGY

## 2025-01-17 RX ORDER — PHENTERMINE HYDROCHLORIDE 37.5 MG/1
37.5 TABLET ORAL
COMMUNITY

## 2025-01-17 ASSESSMENT — ENCOUNTER SYMPTOMS
TROUBLE SWALLOWING: 0
APNEA: 0
SORE THROAT: 0
COUGH: 0
SHORTNESS OF BREATH: 0
BACK PAIN: 0

## 2025-01-17 NOTE — DISCHARGE INSTRUCTIONS
Pre-op Instructions For Out-Patient Surgery    Medication Instructions:  Please stop herbs and any supplements now (includes vitamins and minerals).    For these medications:  Dulaglutide (Trulicity), Exenatide (Byetta and Bydureon, Liraglutide (Victoza), Lixisenatide (Adlyxin), Semaglutide (Ozempic and Rybelsus), Tirzepatide (Mounjaro, Zepbound)- Stop 1 week prior if taking weekly or 1 day prior if taking every 12 hours or daily.     Please contact your surgeon and prescribing physician for pre-op instructions for any blood thinners.     If you have inhalers/aerosol treatments at home, please use them the morning of your surgery and bring the inhalers with you to the hospital.    Please take the following medications the morning of your surgery with a sip of water:    None    Surgery Instructions:  After midnight before surgery:  Do not eat or drink anything, including water, mints, gum, and hard candy.  You may brush your teeth without swallowing.  No smoking, chewing tobacco, or street drugs.    Please shower or bathe before surgery.  If you were given Surgical Scrub Chlorhexidine Gluconate Liquid (CHG), please shower the night before and the morning of your surgery following the detailed instructions you received during your pre-admission visit.     Please do not wear any cologne, lotion, powder, deodorant, jewelry, piercings, perfume, makeup, nail polish, hair accessories, or hair spray on the day of surgery.  Wear loose comfortable clothing.    Leave your valuables at home but bring a payment source for any after-surgery prescriptions you plan to fill at [facility].  Bring a storage case for any glasses/contacts.    An adult who is responsible for you MUST drive you home and should be with you for the first 24 hours after surgery.     If having out-patient knee and foot surgeries, please arrange for planned crutches, walker, or wheelchair before arriving to the  [facility].    The Day of Surgery:  Arrive at [facility] Surgery Entrance at the time directed by your surgeon and check in at the desk.     If you have a living will or healthcare power of , please bring a copy.    You will be taken to the pre-op holding area where you will be prepared for surgery.  A physical assessment will be performed by a nurse practitioner or house officer.  Your IV will be started and you will meet your anesthesiologist.    When you go to surgery, your family will be directed to the surgical waiting room, where the doctor should speak with them after your surgery.    After surgery, you will be taken to the recovery area.  When you are alert and stable, you will receive instructions and be prepared for discharge.     If you use a Bi-PAP or C-PAP machine, please bring it with you and leave it in the car in case it is needed in recovery room.    Follow Bowel Prep instructions given per surgeon

## 2025-01-19 LAB
EKG ATRIAL RATE: 84 BPM
EKG P AXIS: 26 DEGREES
EKG P-R INTERVAL: 160 MS
EKG Q-T INTERVAL: 388 MS
EKG QRS DURATION: 134 MS
EKG QTC CALCULATION (BAZETT): 458 MS
EKG R AXIS: -36 DEGREES
EKG T AXIS: 12 DEGREES
EKG VENTRICULAR RATE: 84 BPM

## 2025-01-19 PROCEDURE — 93010 ELECTROCARDIOGRAM REPORT: CPT | Performed by: INTERNAL MEDICINE

## 2025-01-28 ENCOUNTER — ANESTHESIA EVENT (OUTPATIENT)
Dept: OPERATING ROOM | Age: 60
End: 2025-01-28
Payer: COMMERCIAL

## 2025-01-28 NOTE — PRE-PROCEDURE INSTRUCTIONS
Have you received your Prep? Any questions with prep instructions? Y  Only Clear Liquid Diet day before.Y  Nothing to eat after midnight day before procedure.Y  Are you taking any blood thinners? If so, you need to Stop.N  Remove any jewelry and body piercings.Y  Do you wear glasses? If so, please bring a case to store them in.  Are you having any Covid symptoms?N  Do you have any new rashes, infections, etc. that we should be aware of?N  Do you have a ride home the day of surgery? It cannot be a cab or medical transportation.Y  Verify surgery time/date and what time to arrive at hospital.[phone]

## 2025-01-30 ENCOUNTER — HOSPITAL ENCOUNTER (OUTPATIENT)
Age: 60
Setting detail: OUTPATIENT SURGERY
Discharge: HOME | End: 2025-01-30
Attending: SURGERY | Admitting: SURGERY
Payer: COMMERCIAL

## 2025-01-30 ENCOUNTER — ANESTHESIA (OUTPATIENT)
Dept: OPERATING ROOM | Age: 60
End: 2025-01-30
Payer: COMMERCIAL

## 2025-01-30 VITALS
SYSTOLIC BLOOD PRESSURE: 128 MMHG | TEMPERATURE: 96.9 F | DIASTOLIC BLOOD PRESSURE: 88 MMHG | WEIGHT: 232 LBS | HEIGHT: 70 IN | BODY MASS INDEX: 33.21 KG/M2 | HEART RATE: 81 BPM | RESPIRATION RATE: 14 BRPM | OXYGEN SATURATION: 96 %

## 2025-01-30 DIAGNOSIS — K62.5 RECTAL BLEEDING: ICD-10-CM

## 2025-01-30 DIAGNOSIS — K64.9 HEMORRHOIDS: ICD-10-CM

## 2025-01-30 PROCEDURE — 45385 COLONOSCOPY W/LESION REMOVAL: CPT | Performed by: SURGERY

## 2025-01-30 PROCEDURE — 2580000003 HC RX 258: Performed by: ANESTHESIOLOGY

## 2025-01-30 PROCEDURE — 7100000011 HC PHASE II RECOVERY - ADDTL 15 MIN: Performed by: SURGERY

## 2025-01-30 PROCEDURE — 2709999900 HC NON-CHARGEABLE SUPPLY: Performed by: SURGERY

## 2025-01-30 PROCEDURE — 3700000000 HC ANESTHESIA ATTENDED CARE: Performed by: SURGERY

## 2025-01-30 PROCEDURE — 6370000000 HC RX 637 (ALT 250 FOR IP): Performed by: ANESTHESIOLOGY

## 2025-01-30 PROCEDURE — 7100000010 HC PHASE II RECOVERY - FIRST 15 MIN: Performed by: SURGERY

## 2025-01-30 PROCEDURE — 7100000001 HC PACU RECOVERY - ADDTL 15 MIN: Performed by: SURGERY

## 2025-01-30 PROCEDURE — 6360000002 HC RX W HCPCS: Performed by: NURSE ANESTHETIST, CERTIFIED REGISTERED

## 2025-01-30 PROCEDURE — 7100000031 HC ASPR PHASE II RECOVERY - ADDTL 15 MIN: Performed by: SURGERY

## 2025-01-30 PROCEDURE — 3609010600 HC COLONOSCOPY POLYPECTOMY SNARE/COLD BIOPSY: Performed by: SURGERY

## 2025-01-30 PROCEDURE — 7100000000 HC PACU RECOVERY - FIRST 15 MIN: Performed by: SURGERY

## 2025-01-30 PROCEDURE — 45380 COLONOSCOPY AND BIOPSY: CPT | Performed by: SURGERY

## 2025-01-30 PROCEDURE — 7100000030 HC ASPR PHASE II RECOVERY - FIRST 15 MIN: Performed by: SURGERY

## 2025-01-30 PROCEDURE — 88305 TISSUE EXAM BY PATHOLOGIST: CPT

## 2025-01-30 PROCEDURE — 3700000001 HC ADD 15 MINUTES (ANESTHESIA): Performed by: SURGERY

## 2025-01-30 RX ORDER — ACETAMINOPHEN 500 MG
1000 TABLET ORAL ONCE
Status: COMPLETED | OUTPATIENT
Start: 2025-01-30 | End: 2025-01-30

## 2025-01-30 RX ORDER — MIDAZOLAM HYDROCHLORIDE 1 MG/ML
INJECTION, SOLUTION INTRAMUSCULAR; INTRAVENOUS
Status: DISCONTINUED | OUTPATIENT
Start: 2025-01-30 | End: 2025-01-30 | Stop reason: SDUPTHER

## 2025-01-30 RX ORDER — PROPOFOL 10 MG/ML
INJECTION, EMULSION INTRAVENOUS
Status: DISCONTINUED | OUTPATIENT
Start: 2025-01-30 | End: 2025-01-30 | Stop reason: SDUPTHER

## 2025-01-30 RX ORDER — ONDANSETRON 2 MG/ML
INJECTION INTRAMUSCULAR; INTRAVENOUS
Status: DISCONTINUED | OUTPATIENT
Start: 2025-01-30 | End: 2025-01-30 | Stop reason: SDUPTHER

## 2025-01-30 RX ORDER — DEXAMETHASONE SODIUM PHOSPHATE 4 MG/ML
INJECTION, SOLUTION INTRA-ARTICULAR; INTRALESIONAL; INTRAMUSCULAR; INTRAVENOUS; SOFT TISSUE
Status: DISCONTINUED | OUTPATIENT
Start: 2025-01-30 | End: 2025-01-30 | Stop reason: SDUPTHER

## 2025-01-30 RX ORDER — SODIUM CHLORIDE 9 MG/ML
INJECTION, SOLUTION INTRAVENOUS CONTINUOUS
Status: DISCONTINUED | OUTPATIENT
Start: 2025-01-30 | End: 2025-01-30 | Stop reason: HOSPADM

## 2025-01-30 RX ORDER — SODIUM CHLORIDE 0.9 % (FLUSH) 0.9 %
5-40 SYRINGE (ML) INJECTION EVERY 12 HOURS SCHEDULED
Status: DISCONTINUED | OUTPATIENT
Start: 2025-01-30 | End: 2025-01-30 | Stop reason: HOSPADM

## 2025-01-30 RX ORDER — LIDOCAINE HYDROCHLORIDE 10 MG/ML
1 INJECTION, SOLUTION EPIDURAL; INFILTRATION; INTRACAUDAL; PERINEURAL
Status: DISCONTINUED | OUTPATIENT
Start: 2025-01-30 | End: 2025-01-30 | Stop reason: HOSPADM

## 2025-01-30 RX ORDER — SODIUM CHLORIDE 9 MG/ML
INJECTION, SOLUTION INTRAVENOUS PRN
Status: DISCONTINUED | OUTPATIENT
Start: 2025-01-30 | End: 2025-01-30 | Stop reason: HOSPADM

## 2025-01-30 RX ORDER — SODIUM CHLORIDE 0.9 % (FLUSH) 0.9 %
5-40 SYRINGE (ML) INJECTION PRN
Status: DISCONTINUED | OUTPATIENT
Start: 2025-01-30 | End: 2025-01-30 | Stop reason: HOSPADM

## 2025-01-30 RX ORDER — GABAPENTIN 300 MG/1
300 CAPSULE ORAL ONCE
Status: COMPLETED | OUTPATIENT
Start: 2025-01-30 | End: 2025-01-30

## 2025-01-30 RX ADMIN — PHENYLEPHRINE HYDROCHLORIDE 150 MCG: 10 INJECTION INTRAVENOUS at 11:02

## 2025-01-30 RX ADMIN — ACETAMINOPHEN 1000 MG: 500 TABLET, FILM COATED ORAL at 08:44

## 2025-01-30 RX ADMIN — DEXAMETHASONE SODIUM PHOSPHATE 4 MG: 4 INJECTION, SOLUTION INTRA-ARTICULAR; INTRALESIONAL; INTRAMUSCULAR; INTRAVENOUS; SOFT TISSUE at 11:00

## 2025-01-30 RX ADMIN — MIDAZOLAM 2 MG: 1 INJECTION INTRAMUSCULAR; INTRAVENOUS at 10:53

## 2025-01-30 RX ADMIN — GABAPENTIN 300 MG: 300 CAPSULE ORAL at 08:44

## 2025-01-30 RX ADMIN — ONDANSETRON 4 MG: 2 INJECTION INTRAMUSCULAR; INTRAVENOUS at 11:00

## 2025-01-30 RX ADMIN — PROPOFOL 250 MG: 10 INJECTION, EMULSION INTRAVENOUS at 10:58

## 2025-01-30 RX ADMIN — SODIUM CHLORIDE: 9 INJECTION, SOLUTION INTRAVENOUS at 08:41

## 2025-01-30 RX ADMIN — SODIUM CHLORIDE: 9 INJECTION, SOLUTION INTRAVENOUS at 10:51

## 2025-01-30 ASSESSMENT — PAIN - FUNCTIONAL ASSESSMENT
PAIN_FUNCTIONAL_ASSESSMENT: CRITICAL CARE PAIN OBSERVATION TOOL (CPOT)
PAIN_FUNCTIONAL_ASSESSMENT: 0-10
PAIN_FUNCTIONAL_ASSESSMENT: 0-10

## 2025-01-30 ASSESSMENT — PAIN DESCRIPTION - DESCRIPTORS: DESCRIPTORS: SORE

## 2025-01-30 NOTE — DISCHARGE INSTRUCTIONS
DISCHARGE INSTRUCTIONS FOR COLONOSCOPY    In order to continue your care at home, please follow the instructions below.    For General Anesthesia:  Do not drink any alcoholic beverages or make any legal or important decisions for 24 hours.    Do not drive or operate machinery for 24 hours.  You may return to work after 24 hours.        Diet    Drink plenty of fluids after surgery, unless you are on a fluid restriction.   After general anesthesia, start out eating lightly (broth, soup, bread, etc.) advancing as tolerated to your usual diet.  Try to avoid spicy or greasy/fatty foods for 24 hours.   Avoid milk/milk product for several hours.       Medications  Take medications as ordered by your surgeon.    Activities  Limit your activities for 24 hours.  Walk around to help pass gas.  You may shower.    Call your surgeon for the following:  If you have abdominal pain that is not relieved by passing gas.   For an oral temperature (by mouth) is 101 degrees or higher, chills or excessive sweating.  You have increasing and progressive bleeding or drainage from surgery area.  Persistent nausea or vomiting  Rectal bleeding (may be red, maroon, or black) or change in your bowel habits.  Redness or swelling at the IV site.  If you are unable to urinate within 8 hours of surgery.  For any questions or concerns you may have.    Next screening colonoscopy: 3 years

## 2025-01-30 NOTE — ANESTHESIA POSTPROCEDURE EVALUATION
POST- ANESTHESIA EVALUATION       Pt Name: Praful Stringer  MRN: [id number]  YOB: 1965  Date of evaluation: 1/30/2025  Time:  2:12 PM      /88   Pulse 81   Temp 96.9 °F (36.1 °C) (Infrared)   Resp 14   Ht 1.778 m (5' 10\")   Wt 105.2 kg (232 lb)   SpO2 96%   BMI 33.29 kg/m²      Consciousness Level  Awake  Cardiopulmonary Status  Stable  Pain Adequately Treated YES  Nausea / Vomiting  NO  Adequate Hydration  YES  Anesthesia Related Complications NONE      Electronically signed by Yuval Pearson MD on 1/30/2025 at 2:12 PM     Department of Anesthesiology  Postprocedure Note    Patient: Praful Stringer  MRN: [id number]  YOB: 1965  Date of evaluation: 1/30/2025    Procedure Summary       Date: 01/30/25 Room / Location: [address]    Anesthesia Start: 1053 Anesthesia Stop: 1150    Procedure: COLONOSCOPY POLYPECTOMY BIOPSY/SNARE WITH EXAMINATION UNDER ANESTHESIA (Rectum) Diagnosis:       Rectal bleeding      Hemorrhoids      (Rectal bleeding [K62.5])      (Hemorrhoids [K64.9])    Surgeons: Garth Aguirre MD Responsible Provider: Yuval Pearson MD    Anesthesia Type: general ASA Status: 2            Anesthesia Type: No value filed.    Juliocesar Phase I: Juliocesar Score: 10    Juliocesar Phase II: Juliocesar Score: 10    Anesthesia Post Evaluation    No notable events documented.

## 2025-01-30 NOTE — ANESTHESIA PRE PROCEDURE
Department of Anesthesiology  Preprocedure Note       Name:  Praful Stringer   Age:  59 y.o.  :  1965                                          MRN:  [id number]         Date:  2025      Surgeon: Surgeon(s):  Garth Agurire MD Thusay, Manish, MD    Procedure: Procedure(s):  HEMORRHOIDECTOMY  DIAGNOSTIC COLONOSCOPY WITH EXAMINATION UNDER ANESTHESIA    Medications prior to admission:   Prior to Admission medications    Medication Sig Start Date End Date Taking? Authorizing Provider   phentermine (ADIPEX-P) 37.5 MG tablet Take 1 tablet by mouth every morning (before breakfast).   Yes Zhao Stafford MD   bisacodyl (DULCOLAX) 5 MG EC tablet Take all 4 dulcolax tablets together by mouth at 10:00 AM the day prior to procedure. 24  Yes Claritza Pittman APRN - CNP   polyethylene glycol (GLYCOLAX) 17 GM/SCOOP powder Mix 238 grams of Miralax with 64 ounces of Gatorade (not red or purple) and consume all liquid between 6 PM and 8 PM the evening prior to procedure. 24  Yes Claritza Pittman APRN - CNP   omeprazole (PRILOSEC) 20 MG delayed release capsule Take 1 capsule by mouth daily 24  Yes Zhao Stafford MD   valsartan (DIOVAN) 80 MG tablet Take 1 tablet by mouth daily 24  Yes Zhao Stafford MD   acetaminophen (TYLENOL) 325 MG tablet Take 2 tablets by mouth every 6 hours as needed for Pain PRN   Yes Zhao Stafford MD   ondansetron (ZOFRAN) 4 MG tablet Take 1 tablet by mouth every 6 hours as needed for Nausea or Vomiting 24   Claritza Pittman APRN - CNP   sildenafil (VIAGRA) 100 MG tablet Take 1 tablet by mouth daily as needed 24   ProviderZhao MD       Current medications:    Current Facility-Administered Medications   Medication Dose Route Frequency Provider Last Rate Last Admin   • lidocaine PF 1 % injection 1 mL  1 mL IntraDERmal Once PRN Le Tang MD       • 0.9 % sodium chloride infusion   IntraVENous Continuous Le Tang  mL/hr

## 2025-01-30 NOTE — INTERVAL H&P NOTE
Update History & Physical    The patient's History and Physical of January 17, 25 was reviewed with the patient and I examined the patient. There was no change. The surgical site was confirmed by the patient and me. Pt undergoing for  DIAGNOSTIC COLONOSCOPY WITH EXAMINATION UNDER ANESTHESIA and HEMORRHOIDECTOMY  per Dr. Aguirre.   Pt denies fever/chills, chest pain or SOB  Pt Npo since the past midnight, no am medication today   Denies hx of MRSA infection.  Denies hx of blood clots.  Pt denies taking any blood thinner medication   Awareness under anesthesia.  Denies hx of any other personal or family hx of complications w/anesthesia.   Pt finished all his colon prep and the last bowl movement is clear liquid   Physical exam remain unchanged including cardiac and pulmonary assessment except he has tachycardia   See nursing flow sheet for vital sings     Lab Results   Component Value Date    WBC 5.8 01/17/2025    HGB 18.1 (H) 01/17/2025    HCT 53.2 (H) 01/17/2025    MCV 90.5 01/17/2025     01/17/2025     Lab Results   Component Value Date/Time     01/17/2025 08:30 AM    K 4.5 01/17/2025 08:30 AM     01/17/2025 08:30 AM    CO2 30 01/17/2025 08:30 AM    BUN 19 01/17/2025 08:30 AM    CREATININE 1.2 01/17/2025 08:30 AM    GLUCOSE 114 01/17/2025 08:30 AM    CALCIUM 9.7 01/17/2025 08:30 AM    LABGLOM 71 01/17/2025 08:30 AM        Electronically signed by ISACC De León CNP on 1/30/2025 at 8:06 AM

## 2025-01-30 NOTE — OP NOTE
[facility] General Surgery   Garth Aguirre MD, FACS  Claritza Pittman, APRN-CNP  [address], [address]  [place] [address]  P: [phone], F: [phone]    PROCEDURE NOTE    DATE OF PROCEDURE: 1/30/2025    SURGEON: Garth Aguirre MD    ASSISTANT: None    PREOPERATIVE DIAGNOSIS: Right total bleeding    POSTOPERATIVE DIAGNOSIS: Mildly prominent external hemorrhoids and grade 1 internal hemorrhoid.  Sigmoid polyp.  Sigmoid diverticulosis.  Transverse colon polyp    OPERATION: Total colonoscopy to cecum.  Sigmoid polypectomy with cold snare polypectomy technique and resolution clip application.  Transverse colon polypectomy with cold biopsy forceps  Examination under general anesthesia    ANESTHESIA: General    ESTIMATED BLOOD LOSS: None    COMPLICATIONS: None     SPECIMENS:  Was Obtained:     HISTORY: The patient is a 59 y.o. year old male with history of above preop diagnosis.  I recommended colonoscopy with possible biopsy or polypectomy and I explained the risk, benefits, expected outcome, and alternatives to the procedure.  Risks included but are not limited to bleeding, infection, respiratory distress, hypotension, and perforation of the colon and possibility of missing a lesion.  The patient understands and is in agreement.      PROCEDURE: The patient was given IV conscious sedation.  The patient's SPO2 remained above 90% throughout the procedure. Digital rectal exam was normal.  The colonoscope was inserted through the anus into the rectum and advanced under direct vision to the cecum without difficulty.  Terminal ileum was examined for approximately 2 inches.  The prep was good.      Findings:    Cecum/Ascending colon: normal    Transverse colon: abnormal: Transverse colon polyp removed with cold biopsy forceps    Descending/Sigmoid colon: abnormal: Sigmoid diverticulosis.  Lower sigmoid polyp removed and retrieved with cold snare polypectomy technique and resolution clip

## 2025-02-03 LAB — SURGICAL PATHOLOGY REPORT: NORMAL

## 2025-02-13 ENCOUNTER — OFFICE VISIT (OUTPATIENT)
Age: 60
End: 2025-02-13
Payer: COMMERCIAL

## 2025-02-13 VITALS
HEART RATE: 100 BPM | BODY MASS INDEX: 34.07 KG/M2 | HEIGHT: 70 IN | TEMPERATURE: 97.5 F | DIASTOLIC BLOOD PRESSURE: 62 MMHG | SYSTOLIC BLOOD PRESSURE: 140 MMHG | WEIGHT: 238 LBS | OXYGEN SATURATION: 97 %

## 2025-02-13 DIAGNOSIS — K57.30 SIGMOID DIVERTICULOSIS: ICD-10-CM

## 2025-02-13 DIAGNOSIS — K64.8 INTERNAL HEMORRHOID: Primary | ICD-10-CM

## 2025-02-13 DIAGNOSIS — K63.5 BENIGN COLON POLYP: ICD-10-CM

## 2025-02-13 PROCEDURE — 99214 OFFICE O/P EST MOD 30 MIN: CPT | Performed by: SURGERY

## 2025-02-13 NOTE — PATIENT INSTRUCTIONS
You will be due for your next colonoscopy in 3 years, which will be January 2028.   Call if hemorrhoid symptoms worsen at any time.

## (undated) DEVICE — Z INACTIVE USE 2653177 SPONGE GZ W2XL2IN NONWOVEN 4 PLY FASTER WICKING ABIL AVANT

## (undated) DEVICE — DUP USE 393023 JELLY LUBRICATING EZ 2OZ

## (undated) DEVICE — Z INACTIVE USE 2641839 CLIP INT M L POLYMER LOK LIG HEM O LOK

## (undated) DEVICE — PENCIL ES L3M BTTN SWCH HOLSTER W/ BLDE ELECTRD EDGE

## (undated) DEVICE — COVER,MAYO STAND,XL,STERILE: Brand: MEDLINE

## (undated) DEVICE — KIT CLN UP LIN W/ STD SAHARA TBL SHT 40X60IN DRAW/LIFT SHT

## (undated) DEVICE — 3M™ WARMING BLANKET, UPPER BODY, 10 PER CASE, 42268: Brand: BAIR HUGGER™

## (undated) DEVICE — GLOVE ORANGE PI 7 1/2   MSG9075

## (undated) DEVICE — SUCTION/IRRIGATOR: Brand: ENDOWRIST;DAVINCI SI

## (undated) DEVICE — SUTURE PERMAHAND SZ 0 L30IN NONABSORBABLE BLK FSL L30MM 3/8 680H

## (undated) DEVICE — DISPOSABLE LAPAROSCOPIC CORDS, 1 PER POUCH: Brand: A&E MEDICAL / DISPOSABLE LAPAROSCOPIC CORDS

## (undated) DEVICE — GOWN,AURORA,NONREINFORCED,LARGE: Brand: MEDLINE

## (undated) DEVICE — SYRINGE, LUER SLIP, 20ML: Brand: MEDLINE

## (undated) DEVICE — SPONGE DRN W4XL4IN RAYON/POLYESTER 6 PLY NONWOVEN PRECUT

## (undated) DEVICE — AGENT HEMSTAT 5GM ARISTA AH

## (undated) DEVICE — SUTURE PDS II SZ 0 L27IN ABSRB VLT UR-6 L26MM 1/2 CIR D7185

## (undated) DEVICE — DEFENDO AIR WATER SUCTION AND BIOPSY VALVE KIT FOR  OLYMPUS: Brand: DEFENDO AIR/WATER/SUCTION AND BIOPSY VALVE

## (undated) DEVICE — KIT DRN FLAT W/ 100CC EVAC 10MM FULL PERF

## (undated) DEVICE — SOLUTION IV IRRIG POUR BRL 0.9% SODIUM CHL 2F7124

## (undated) DEVICE — FORCEPS BX L240CM WRK CHN 2.8MM STD CAP W/ NDL MIC MESH

## (undated) DEVICE — POLYP TRAP: Brand: TRAPEASE®

## (undated) DEVICE — TROCARS: Brand: KII® BALLOON BLUNT TIP SYSTEM

## (undated) DEVICE — Z DISCONTINUED GLOVE SURG SZ 7.5 L12IN FNGR THK13MIL WHT ISOLEX

## (undated) DEVICE — ST CHARLES GEN LAPAROSCOPY PK: Brand: MEDLINE INDUSTRIES, INC.

## (undated) DEVICE — SNARE ENDOSCP L240CM LOOP W13MM DIA2.4MM SHT THROW SM OVL

## (undated) DEVICE — BAG SPEC LAP H6IN DIA3IN 250ML 10 12MM CANN ATTCH MEM WIRE

## (undated) DEVICE — COVER,TABLE,60X90,STERILE: Brand: MEDLINE

## (undated) DEVICE — SOLUTION ANTIFOG VIS SYS CLEARIFY LAPSCP

## (undated) DEVICE — 3M™ STERI-STRIP™ WOUND CLOSURE SYSTEMS 5 EACH/PACK 25 PACKS/CARTON 4 CARTONS/CASE W8516: Brand: 3M™ STERI-STRIP™

## (undated) DEVICE — OBTURATOR ROBOTIC DIA8MM BLDELSS ENDOSCP DISP DA VINCI SI

## (undated) DEVICE — MAX-CORE® DISPOSABLE CORE BIOPSY INSTRUMENT, 16G X 16CM: Brand: MAX-CORE

## (undated) DEVICE — KIT DRP 3 ARM ACC DISP ENDOWRIST DA VINCI SI

## (undated) DEVICE — SUTURE MCRYL + SZ 4-0 L27IN ABSRB UD L19MM PS-2 3/8 CIR MCP426H

## (undated) DEVICE — TROCAR: Brand: KII FIOS FIRST ENTRY

## (undated) DEVICE — ENDO KIT W/SYRINGE: Brand: MEDLINE INDUSTRIES, INC.